# Patient Record
Sex: FEMALE | Race: OTHER | NOT HISPANIC OR LATINO
[De-identification: names, ages, dates, MRNs, and addresses within clinical notes are randomized per-mention and may not be internally consistent; named-entity substitution may affect disease eponyms.]

---

## 2017-03-01 ENCOUNTER — RESULT REVIEW (OUTPATIENT)
Age: 52
End: 2017-03-01

## 2017-11-20 ENCOUNTER — RX RENEWAL (OUTPATIENT)
Age: 52
End: 2017-11-20

## 2018-02-09 ENCOUNTER — RX RENEWAL (OUTPATIENT)
Age: 53
End: 2018-02-09

## 2018-03-15 ENCOUNTER — APPOINTMENT (OUTPATIENT)
Dept: INTERNAL MEDICINE | Facility: CLINIC | Age: 53
End: 2018-03-15
Payer: COMMERCIAL

## 2018-03-15 VITALS
OXYGEN SATURATION: 98 % | DIASTOLIC BLOOD PRESSURE: 88 MMHG | HEART RATE: 73 BPM | WEIGHT: 186 LBS | SYSTOLIC BLOOD PRESSURE: 140 MMHG | HEIGHT: 62.5 IN | BODY MASS INDEX: 33.37 KG/M2

## 2018-03-15 VITALS — SYSTOLIC BLOOD PRESSURE: 120 MMHG | DIASTOLIC BLOOD PRESSURE: 82 MMHG

## 2018-03-15 DIAGNOSIS — R03.0 ELEVATED BLOOD-PRESSURE READING, W/OUT DIAGNOSIS OF HYPERTENSION: ICD-10-CM

## 2018-03-15 PROCEDURE — 99396 PREV VISIT EST AGE 40-64: CPT

## 2018-03-16 LAB
25(OH)D3 SERPL-MCNC: 26.9 NG/ML
ALBUMIN SERPL ELPH-MCNC: 4.6 G/DL
ALP BLD-CCNC: 75 U/L
ALT SERPL-CCNC: 18 U/L
ANION GAP SERPL CALC-SCNC: 13 MMOL/L
AST SERPL-CCNC: 21 U/L
BASOPHILS # BLD AUTO: 0.03 K/UL
BASOPHILS NFR BLD AUTO: 0.6 %
BILIRUB SERPL-MCNC: 0.5 MG/DL
BUN SERPL-MCNC: 9 MG/DL
CALCIUM SERPL-MCNC: 10 MG/DL
CHLORIDE SERPL-SCNC: 95 MMOL/L
CHOLEST SERPL-MCNC: 191 MG/DL
CHOLEST/HDLC SERPL: 5.3 RATIO
CO2 SERPL-SCNC: 27 MMOL/L
CREAT SERPL-MCNC: 0.6 MG/DL
EOSINOPHIL # BLD AUTO: 0.13 K/UL
EOSINOPHIL NFR BLD AUTO: 2.5 %
GLUCOSE SERPL-MCNC: 90 MG/DL
HBA1C MFR BLD HPLC: 6.4 %
HCT VFR BLD CALC: 40.1 %
HDLC SERPL-MCNC: 36 MG/DL
HGB BLD-MCNC: 12.9 G/DL
IMM GRANULOCYTES NFR BLD AUTO: 0.4 %
LDLC SERPL CALC-MCNC: 106 MG/DL
LYMPHOCYTES # BLD AUTO: 3.19 K/UL
LYMPHOCYTES NFR BLD AUTO: 61.5 %
MAN DIFF?: NORMAL
MCHC RBC-ENTMCNC: 24.2 PG
MCHC RBC-ENTMCNC: 32.2 GM/DL
MCV RBC AUTO: 75.4 FL
MONOCYTES # BLD AUTO: 0.28 K/UL
MONOCYTES NFR BLD AUTO: 5.4 %
NEUTROPHILS # BLD AUTO: 1.54 K/UL
NEUTROPHILS NFR BLD AUTO: 29.6 %
PLATELET # BLD AUTO: 292 K/UL
POTASSIUM SERPL-SCNC: 3.9 MMOL/L
PROT SERPL-MCNC: 8.1 G/DL
RBC # BLD: 5.32 M/UL
RBC # FLD: 14.4 %
SODIUM SERPL-SCNC: 135 MMOL/L
TRIGL SERPL-MCNC: 247 MG/DL
TSH SERPL-ACNC: 1.18 UIU/ML
WBC # FLD AUTO: 5.19 K/UL

## 2018-03-22 LAB
ADJUSTED MITOGEN: >10 IU/ML
ADJUSTED TB AG: 0.11 IU/ML
M TB IFN-G BLD-IMP: NEGATIVE
QUANTIFERON GOLD NIL: 0.04 IU/ML

## 2018-07-25 ENCOUNTER — RX RENEWAL (OUTPATIENT)
Age: 53
End: 2018-07-25

## 2018-11-14 ENCOUNTER — APPOINTMENT (OUTPATIENT)
Dept: CARDIOLOGY | Facility: CLINIC | Age: 53
End: 2018-11-14
Payer: COMMERCIAL

## 2018-11-14 ENCOUNTER — NON-APPOINTMENT (OUTPATIENT)
Age: 53
End: 2018-11-14

## 2018-11-14 VITALS — DIASTOLIC BLOOD PRESSURE: 74 MMHG | OXYGEN SATURATION: 97 % | SYSTOLIC BLOOD PRESSURE: 123 MMHG | HEART RATE: 76 BPM

## 2018-11-14 VITALS — HEIGHT: 62.5 IN | WEIGHT: 185 LBS | BODY MASS INDEX: 33.19 KG/M2

## 2018-11-14 PROCEDURE — 93000 ELECTROCARDIOGRAM COMPLETE: CPT

## 2018-11-14 PROCEDURE — 99244 OFF/OP CNSLTJ NEW/EST MOD 40: CPT | Mod: 25

## 2018-11-14 NOTE — PHYSICAL EXAM
[General Appearance - Well Developed] : well developed [Normal Conjunctiva] : the conjunctiva exhibited no abnormalities [Normal Oral Mucosa] : normal oral mucosa [Normal Jugular Venous A Waves Present] : normal jugular venous A waves present [Heart Rate And Rhythm] : heart rate and rhythm were normal [Heart Sounds] : normal S1 and S2 [Murmurs] : no murmurs present [Arterial Pulses Normal] : the arterial pulses were normal [Edema] : no peripheral edema present [Veins - Varicosity Changes] : no varicosital changes were noted in the lower extremities [Respiration, Rhythm And Depth] : normal respiratory rhythm and effort [Exaggerated Use Of Accessory Muscles For Inspiration] : no accessory muscle use [Auscultation Breath Sounds / Voice Sounds] : lungs were clear to auscultation bilaterally [Chest Palpation] : palpation of the chest revealed no abnormalities [Lungs Percussion] : the lungs were normal to percussion [Abdomen Soft] : soft [Abdomen Tenderness] : non-tender [] : no hepato-splenomegaly [Abdomen Mass (___ Cm)] : no abdominal mass palpated [Nail Clubbing] : no clubbing of the fingernails [Cyanosis, Localized] : no localized cyanosis [Skin Color & Pigmentation] : normal skin color and pigmentation [Oriented To Time, Place, And Person] : oriented to person, place, and time

## 2018-11-14 NOTE — ASSESSMENT
[FreeTextEntry1] : Patient  with above hx \par \par Palpitations  ? possible symptomatic PACS or PVC ,advised the patient to decrease the coffee intake  will obtain holter monitor ,   will obtain electrolytes , lipid profile , TSH level , echocardiogram .\par \par HTN  elevated diastolic BP , would  recommend low salt diet ,   micardis/hctz 20/12.5 mg

## 2018-11-14 NOTE — REASON FOR VISIT
[Consultation] : a consultation regarding [Hyperlipidemia] : hyperlipidemia [Hypertension] : hypertension [Medication Management] : Medication management [Palpitations] : palpitations

## 2018-11-14 NOTE — HISTORY OF PRESENT ILLNESS
[FreeTextEntry1] : 53 year old female with  hx of hypertension , hyperlipidemia ,  obesity , occasional GERD who came with complain of having  intermittent skipped heart beat for last 4 days ,  skipped beats followed by sinking feeling ,  not associated with  shortness of breath or dizziness or chest pain ,   \par \par Patient does drink 2 cups of coffee , 1 cup of tea a day

## 2018-11-21 ENCOUNTER — APPOINTMENT (OUTPATIENT)
Dept: CARDIOLOGY | Facility: CLINIC | Age: 53
End: 2018-11-21
Payer: COMMERCIAL

## 2018-11-21 PROCEDURE — 93306 TTE W/DOPPLER COMPLETE: CPT

## 2018-11-28 ENCOUNTER — APPOINTMENT (OUTPATIENT)
Dept: CARDIOLOGY | Facility: CLINIC | Age: 53
End: 2018-11-28
Payer: COMMERCIAL

## 2018-11-28 PROCEDURE — 93224 XTRNL ECG REC UP TO 48 HRS: CPT

## 2018-11-30 ENCOUNTER — NON-APPOINTMENT (OUTPATIENT)
Age: 53
End: 2018-11-30

## 2018-12-17 ENCOUNTER — MEDICATION RENEWAL (OUTPATIENT)
Age: 53
End: 2018-12-17

## 2019-01-15 ENCOUNTER — RX RENEWAL (OUTPATIENT)
Age: 54
End: 2019-01-15

## 2019-02-14 ENCOUNTER — APPOINTMENT (OUTPATIENT)
Dept: INTERNAL MEDICINE | Facility: CLINIC | Age: 54
End: 2019-02-14
Payer: COMMERCIAL

## 2019-02-14 VITALS
SYSTOLIC BLOOD PRESSURE: 122 MMHG | OXYGEN SATURATION: 97 % | HEIGHT: 62.5 IN | WEIGHT: 183 LBS | DIASTOLIC BLOOD PRESSURE: 80 MMHG | BODY MASS INDEX: 32.83 KG/M2 | HEART RATE: 88 BPM

## 2019-02-14 DIAGNOSIS — R00.2 PALPITATIONS: ICD-10-CM

## 2019-02-14 PROCEDURE — 99396 PREV VISIT EST AGE 40-64: CPT

## 2019-02-26 ENCOUNTER — MESSAGE (OUTPATIENT)
Age: 54
End: 2019-02-26

## 2019-02-26 LAB
ALBUMIN SERPL ELPH-MCNC: 5.1 G/DL
ALP BLD-CCNC: 58 U/L
ALT SERPL-CCNC: 15 U/L
ANION GAP SERPL CALC-SCNC: 13 MMOL/L
AST SERPL-CCNC: 17 U/L
BASOPHILS # BLD AUTO: 0.05 K/UL
BASOPHILS NFR BLD AUTO: 0.9 %
BILIRUB SERPL-MCNC: 0.5 MG/DL
BUN SERPL-MCNC: 22 MG/DL
CALCIUM SERPL-MCNC: 10.5 MG/DL
CHLORIDE SERPL-SCNC: 101 MMOL/L
CHOLEST SERPL-MCNC: 193 MG/DL
CHOLEST/HDLC SERPL: 4.9 RATIO
CO2 SERPL-SCNC: 24 MMOL/L
CREAT SERPL-MCNC: 0.76 MG/DL
EOSINOPHIL # BLD AUTO: 0.15 K/UL
EOSINOPHIL NFR BLD AUTO: 2.8 %
GLUCOSE SERPL-MCNC: 88 MG/DL
HBA1C MFR BLD HPLC: 6.3 %
HCT VFR BLD CALC: 43.4 %
HDLC SERPL-MCNC: 39 MG/DL
HGB BLD-MCNC: 13.5 G/DL
IMM GRANULOCYTES NFR BLD AUTO: 0.4 %
LDLC SERPL CALC-MCNC: 122 MG/DL
LYMPHOCYTES # BLD AUTO: 3.39 K/UL
LYMPHOCYTES NFR BLD AUTO: 63.6 %
M TB IFN-G BLD-IMP: NEGATIVE
MAN DIFF?: NORMAL
MCHC RBC-ENTMCNC: 23.9 PG
MCHC RBC-ENTMCNC: 31.1 GM/DL
MCV RBC AUTO: 77 FL
MONOCYTES # BLD AUTO: 0.25 K/UL
MONOCYTES NFR BLD AUTO: 4.7 %
NEUTROPHILS # BLD AUTO: 1.47 K/UL
NEUTROPHILS NFR BLD AUTO: 27.6 %
PLATELET # BLD AUTO: 330 K/UL
POTASSIUM SERPL-SCNC: 4.1 MMOL/L
PROT SERPL-MCNC: 8.1 G/DL
QUANTIFERON TB PLUS MITOGEN MINUS NIL: 9.01 IU/ML
QUANTIFERON TB PLUS NIL: 0.04 IU/ML
QUANTIFERON TB PLUS TB1 MINUS NIL: 0.03 IU/ML
QUANTIFERON TB PLUS TB2 MINUS NIL: 0.03 IU/ML
RBC # BLD: 5.64 M/UL
RBC # FLD: 14.3 %
SODIUM SERPL-SCNC: 138 MMOL/L
TRIGL SERPL-MCNC: 160 MG/DL
TSH SERPL-ACNC: 1.28 UIU/ML
WBC # FLD AUTO: 5.33 K/UL

## 2019-02-26 NOTE — ADDENDUM
[FreeTextEntry1] : Reviewed labs, discussed with patient.\par Given multiple risk factors, start statin.\par Atorvastatin 10mg eprescribed.\par Pt to repeat lft's and flp in 3 mos.\par Form completed and to be faxed along with quantiferon gold results.\par

## 2019-02-26 NOTE — REVIEW OF SYSTEMS
[Vision Problems] : vision problems [Palpitations] : palpitations [Negative] : Heme/Lymph [de-identified] : folliculitis

## 2019-02-26 NOTE — HEALTH RISK ASSESSMENT
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

## 2019-02-26 NOTE — HISTORY OF PRESENT ILLNESS
[de-identified] : 52 y/o F here for CPE\par Last CPE 3/2018, but she recently changed insurance.\par Had palpitations last November, saw cardiology, Holter/ECHO done, unremarkable\par Has not increased exercise much. Committing to a diet is difficult.\par Has noticed that she also seems to be getting more hair follicle inflammation than usual.\par She is requesting an u/s of her abdomen to evaluate for gallbladder. She has no pain at present, but both had multiple family members who had gallstones that lodged in the common bile duct.  \par

## 2019-02-26 NOTE — PHYSICAL EXAM
[No Acute Distress] : no acute distress [Well Nourished] : well nourished [Well Developed] : well developed [Well-Appearing] : well-appearing [Normal Sclera/Conjunctiva] : normal sclera/conjunctiva [PERRL] : pupils equal round and reactive to light [EOMI] : extraocular movements intact [Normal Outer Ear/Nose] : the outer ears and nose were normal in appearance [Normal Oropharynx] : the oropharynx was normal [No JVD] : no jugular venous distention [Supple] : supple [No Lymphadenopathy] : no lymphadenopathy [Thyroid Normal, No Nodules] : the thyroid was normal and there were no nodules present [No Respiratory Distress] : no respiratory distress  [Clear to Auscultation] : lungs were clear to auscultation bilaterally [No Accessory Muscle Use] : no accessory muscle use [Normal Rate] : normal rate  [Regular Rhythm] : with a regular rhythm [Normal S1, S2] : normal S1 and S2 [No Murmur] : no murmur heard [No Carotid Bruits] : no carotid bruits [Pedal Pulses Present] : the pedal pulses are present [No Edema] : there was no peripheral edema [Soft] : abdomen soft [Non Tender] : non-tender [Non-distended] : non-distended [No Masses] : no abdominal mass palpated [No HSM] : no HSM [Normal Bowel Sounds] : normal bowel sounds [Normal Supraclavicular Nodes] : no supraclavicular lymphadenopathy [Normal Anterior Cervical Nodes] : no anterior cervical lymphadenopathy [No CVA Tenderness] : no CVA  tenderness [No Spinal Tenderness] : no spinal tenderness [No Joint Swelling] : no joint swelling [Grossly Normal Strength/Tone] : grossly normal strength/tone [Normal Gait] : normal gait [Coordination Grossly Intact] : coordination grossly intact [No Focal Deficits] : no focal deficits [Deep Tendon Reflexes (DTR)] : deep tendon reflexes were 2+ and symmetric [Normal Affect] : the affect was normal [Normal Insight/Judgement] : insight and judgment were intact [de-identified] : xanthalesmas on eyelid

## 2019-02-26 NOTE — ASSESSMENT
[FreeTextEntry1] : 54 y/o F here for CPE\par Exam remarkable for increased bmi\par HTN: Controlled, c/w regimen\par HLD: check flp\par Prediabetes: check alc\par Fam h/o gallstones: Check u/s abd. If stones are present, will refer her for elective cholecystectomy\par Ashtma: quiescient\par HCM: utd, Td today\par Check quantiferon gold for work\par rto 1 yr or prn

## 2019-02-28 ENCOUNTER — APPOINTMENT (OUTPATIENT)
Dept: CARDIOLOGY | Facility: CLINIC | Age: 54
End: 2019-02-28

## 2019-07-09 ENCOUNTER — MESSAGE (OUTPATIENT)
Age: 54
End: 2019-07-09

## 2019-07-11 ENCOUNTER — MESSAGE (OUTPATIENT)
Age: 54
End: 2019-07-11

## 2019-07-30 ENCOUNTER — MESSAGE (OUTPATIENT)
Age: 54
End: 2019-07-30

## 2019-08-08 ENCOUNTER — APPOINTMENT (OUTPATIENT)
Dept: INTERNAL MEDICINE | Facility: CLINIC | Age: 54
End: 2019-08-08

## 2019-08-23 ENCOUNTER — NON-APPOINTMENT (OUTPATIENT)
Age: 54
End: 2019-08-23

## 2019-08-23 ENCOUNTER — APPOINTMENT (OUTPATIENT)
Dept: PULMONOLOGY | Facility: CLINIC | Age: 54
End: 2019-08-23
Payer: COMMERCIAL

## 2019-08-23 VITALS
HEART RATE: 102 BPM | BODY MASS INDEX: 33.88 KG/M2 | HEIGHT: 63 IN | DIASTOLIC BLOOD PRESSURE: 80 MMHG | OXYGEN SATURATION: 98 % | WEIGHT: 191.2 LBS | SYSTOLIC BLOOD PRESSURE: 130 MMHG | RESPIRATION RATE: 17 BRPM

## 2019-08-23 PROCEDURE — 94010 BREATHING CAPACITY TEST: CPT

## 2019-08-23 PROCEDURE — 71046 X-RAY EXAM CHEST 2 VIEWS: CPT

## 2019-08-23 PROCEDURE — 99204 OFFICE O/P NEW MOD 45 MIN: CPT | Mod: 25

## 2019-08-23 NOTE — PROCEDURE
[FreeTextEntry1] : PFT - spi reveals normal flows; FEV1 2.10L is which is 82% of predicted, normal flow volume loop \par \par CXR- normal sized heat, no evidence of infiltrate or effusion

## 2019-08-23 NOTE — HISTORY OF PRESENT ILLNESS
[FreeTextEntry1] : Ms. Osullivan is a 54 year old female presenting to the office for a follow-up visit. She has a history of asthmatic bronchitis, GERD, LAKSHMI, chronic sinusitis, post nasal drip, H. pyroli, HTN, HLD, OW, SOB, and cough variant asthma. Her chief complaint is her coughing.\par - notes that she does not cough at night but after talking and drinking coffee\par - her coughing has been persistent for 6 weeks\par - she has LAKSHMI and is suppose to have a sleep study done in September\par - she denies SOB and palpitations\par - she has an allergy to nickel\par - she occasionally uses Flonase for her sinuses\par - she has a nebulizer but has not been using it \par -she denies any headaches, nausea, vomiting, fever, chills, sweats, chest pain, chest pressure, diarrhea, constipation, dysphagia, dizziness, leg swelling, leg pain, itchy eyes, itchy ears, heartburn, reflux, or sour taste in the mouth.

## 2019-08-23 NOTE — ASSESSMENT
[FreeTextEntry1] :  Aileen is a  54 year old female with a history of HLD, HTN, low vitamin D, cough variant asthma, H. pylori and ?LAKSHMI who now comes to the office for an pulmonary re-evaluation.\par \par shortness of breath is multifactorial due to:\par -poor mechanics of breathing \par -out of shape / overweight\par -pulmonary disease\par -cardiac disease \par \par problem 1: acute bronchitis \par - no need for any further antibiotics \par \par problem 2: asthmatic bronchitis\par - prednisone 20 mg for 7 days and 10 mg for 7 days \par - Xopenex .63 2-3x \par - Breo 200 mg 1x a day \par - Singular 10 mg before bed\par - olopaadine  .6 1 sniff 2x day\par \par problem 3: GERD\par - Zantac 300 mg at night \par - Things to avoid including overeating, spicy foods, tight clothing, eating within three hours of bed, this list is not all inclusive. \par -Rule of 2's: avoid eating too much, eating too late, eating too spicy, eating two hours before bed\par -For treatment of reflux, possible options discussed including diet control, H2 blockers, PPIs, as well as coating motility agents discussed as treatment options. Timing of meals and proximity of last meal to sleep were discussed. If symptoms persist, a formal gastrointestinal evaluation is needed. \par \par problem 4: LAKSHMI\par - r/o LAKSHMI\par - sleep study pending\par \par problem: Allergy\par - F/U for allergy testing-bloods\par \par problem : health maintenance \par -recommended yearly flu shot after October 15\par -recommended strep pneumonia vaccines: Prevnar-13 vaccine, followed by Pneumo vaccine 23 one year following\par -recommended early intervention for Upper Respiratory Infections (URIs)\par -recommended regular osteoporosis evaluations\par -recommended early dermatological evaluations\par -recommended after the age of 50 to the age of 70, colonoscopy every 5 years\par \par F/U in 6-8 weeks.\par He She is encouraged to call with any changes, concerns, or questions

## 2019-08-23 NOTE — PHYSICAL EXAM
[General Appearance - Well Developed] : well developed [Normal Appearance] : normal appearance [Well Groomed] : well groomed [General Appearance - Well Nourished] : well nourished [General Appearance - In No Acute Distress] : no acute distress [No Deformities] : no deformities [Normal Conjunctiva] : the conjunctiva exhibited no abnormalities [Normal Oropharynx] : normal oropharynx [Eyelids - No Xanthelasma] : the eyelids demonstrated no xanthelasmas [III] : III [Neck Appearance] : the appearance of the neck was normal [Neck Cervical Mass (___cm)] : no neck mass was observed [Jugular Venous Distention Increased] : there was no jugular-venous distention [Thyroid Nodule] : there were no palpable thyroid nodules [Thyroid Diffuse Enlargement] : the thyroid was not enlarged [Heart Rate And Rhythm] : heart rate and rhythm were normal [Murmurs] : no murmurs present [Heart Sounds] : normal S1 and S2 [FreeTextEntry1] : I:E ratio 1:3; expiatory wheezes

## 2019-08-23 NOTE — ADDENDUM
[FreeTextEntry1] : I, Imani Knox, acted solely as a scribe for Dr. Hui on this date 08/23/2019.\par \par All medical record entries made by the Scribe were at my, Dr. Hui, direction and personally dictated by me on 08/23/2019. I have reviewed the chart and agree that the record accurately reflects my personal performance of the history, physical exam, assessment and plan.  I have also personally directed, reviewed and agreed with the chart.

## 2019-09-10 ENCOUNTER — APPOINTMENT (OUTPATIENT)
Dept: SLEEP CENTER | Facility: CLINIC | Age: 54
End: 2019-09-10
Payer: COMMERCIAL

## 2019-09-10 ENCOUNTER — OUTPATIENT (OUTPATIENT)
Dept: OUTPATIENT SERVICES | Facility: HOSPITAL | Age: 54
LOS: 1 days | End: 2019-09-10
Payer: COMMERCIAL

## 2019-09-10 PROCEDURE — 95806 SLEEP STUDY UNATT&RESP EFFT: CPT | Mod: 26

## 2019-09-10 PROCEDURE — 95806 SLEEP STUDY UNATT&RESP EFFT: CPT

## 2019-09-23 ENCOUNTER — MEDICATION RENEWAL (OUTPATIENT)
Age: 54
End: 2019-09-23

## 2019-10-01 ENCOUNTER — MESSAGE (OUTPATIENT)
Age: 54
End: 2019-10-01

## 2019-10-04 DIAGNOSIS — G47.33 OBSTRUCTIVE SLEEP APNEA (ADULT) (PEDIATRIC): ICD-10-CM

## 2019-10-28 ENCOUNTER — APPOINTMENT (OUTPATIENT)
Dept: PULMONOLOGY | Facility: CLINIC | Age: 54
End: 2019-10-28
Payer: COMMERCIAL

## 2019-10-28 ENCOUNTER — NON-APPOINTMENT (OUTPATIENT)
Age: 54
End: 2019-10-28

## 2019-10-28 VITALS
BODY MASS INDEX: 35.7 KG/M2 | HEART RATE: 94 BPM | RESPIRATION RATE: 17 BRPM | HEIGHT: 62 IN | OXYGEN SATURATION: 98 % | DIASTOLIC BLOOD PRESSURE: 70 MMHG | WEIGHT: 194 LBS | SYSTOLIC BLOOD PRESSURE: 122 MMHG

## 2019-10-28 DIAGNOSIS — J45.901 ACUTE BRONCHITIS, UNSPECIFIED: ICD-10-CM

## 2019-10-28 DIAGNOSIS — Z23 ENCOUNTER FOR IMMUNIZATION: ICD-10-CM

## 2019-10-28 DIAGNOSIS — J20.9 ACUTE BRONCHITIS, UNSPECIFIED: ICD-10-CM

## 2019-10-28 PROCEDURE — G0008: CPT

## 2019-10-28 PROCEDURE — 90682 RIV4 VACC RECOMBINANT DNA IM: CPT

## 2019-10-28 PROCEDURE — 95012 NITRIC OXIDE EXP GAS DETER: CPT

## 2019-10-28 PROCEDURE — 99214 OFFICE O/P EST MOD 30 MIN: CPT | Mod: 25

## 2019-10-28 PROCEDURE — 94010 BREATHING CAPACITY TEST: CPT

## 2019-10-28 RX ORDER — PREDNISONE 10 MG/1
10 TABLET ORAL
Qty: 50 | Refills: 0 | Status: DISCONTINUED | COMMUNITY
Start: 2019-08-23 | End: 2019-10-28

## 2019-10-28 RX ORDER — RANITIDINE 300 MG/1
300 TABLET ORAL
Qty: 90 | Refills: 3 | Status: DISCONTINUED | COMMUNITY
Start: 2019-08-23 | End: 2019-10-28

## 2019-10-28 NOTE — PHYSICAL EXAM
[General Appearance - Well Developed] : well developed [Normal Appearance] : normal appearance [Well Groomed] : well groomed [General Appearance - Well Nourished] : well nourished [No Deformities] : no deformities [General Appearance - In No Acute Distress] : no acute distress [Normal Conjunctiva] : the conjunctiva exhibited no abnormalities [Eyelids - No Xanthelasma] : the eyelids demonstrated no xanthelasmas [Normal Oropharynx] : normal oropharynx [III] : III [Neck Appearance] : the appearance of the neck was normal [Neck Cervical Mass (___cm)] : no neck mass was observed [Jugular Venous Distention Increased] : there was no jugular-venous distention [Thyroid Diffuse Enlargement] : the thyroid was not enlarged [Thyroid Nodule] : there were no palpable thyroid nodules [Heart Rate And Rhythm] : heart rate and rhythm were normal [Heart Sounds] : normal S1 and S2 [Murmurs] : no murmurs present [Abdomen Soft] : soft [Abdomen Tenderness] : non-tender [Abdomen Mass (___ Cm)] : no abdominal mass palpated [Abnormal Walk] : normal gait [Gait - Sufficient For Exercise Testing] : the gait was sufficient for exercise testing [Nail Clubbing] : no clubbing of the fingernails [Cyanosis, Localized] : no localized cyanosis [Petechial Hemorrhages (___cm)] : no petechial hemorrhages [Skin Color & Pigmentation] : normal skin color and pigmentation [] : no rash [No Venous Stasis] : no venous stasis [Skin Lesions] : no skin lesions [No Skin Ulcers] : no skin ulcer [No Xanthoma] : no  xanthoma was observed [Deep Tendon Reflexes (DTR)] : deep tendon reflexes were 2+ and symmetric [Sensation] : the sensory exam was normal to light touch and pinprick [No Focal Deficits] : no focal deficits [Oriented To Time, Place, And Person] : oriented to person, place, and time [Impaired Insight] : insight and judgment were intact [Affect] : the affect was normal [FreeTextEntry1] : I:E ratio 1:3; expiatory wheezes

## 2019-10-28 NOTE — HISTORY OF PRESENT ILLNESS
[FreeTextEntry1] : Ms. Osullivan is a 54 year old female presenting to the office for a follow-up visit. She has a history of asthmatic bronchitis, GERD, LAKSHMI, chronic sinusitis, post nasal drip, H. pyroli, HTN, HLD, OW, SOB, and cough variant asthma. Her chief complaint is LAKSHMI.\par -she reports feeling well\par -she notes her cough has completely resolved about 3 days ago with use of Singulair and Breo\par -she states having had 1 bad cold with sinusitis and took Biaxin, since her last visit\par -she reports she isn't exercising currently\par -she reports having heartburn, due to stopping the Zantac due to the recall, and wishes to make another GI appointment\par -she reports she is s/p her sleep study\par -she reports she isn't waking up rested in the morning, but with no aches\par -she states her sinuses are clear with the use of her nasal spray\par -she denies taking any new medications, vitamins, or supplements, except vitamin C\par -she reports she is feeling 100% in terms of her asthma, and doesn’t want to take Breo continuously\par -she denies any SOB, wheezing, chest pain, chest pressure, diarrhea, constipation, dysphagia, dizziness, sour taste in the mouth, myalgias or arthralgias.

## 2019-10-28 NOTE — ADDENDUM
[FreeTextEntry1] : Documented by Tadeo August acting as a scribe for Dr. Jaime Vera on 10/28/2019.\par \par All medical record entries made by the Scribe were at my, Dr. Jaime Vera's, direction and personally dictated by me on 10/28/2019. I have reviewed the chart and agree that the record accurately reflects my personal performance of the history, physical exam, assessment and plan. I have also personally directed, reviewed, and agree with the discharge instructions.

## 2019-10-28 NOTE — REVIEW OF SYSTEMS
[Negative] : Sleep Disorder [Heartburn] : heartburn [Reflux] : reflux [As Noted in HPI] : as noted in HPI [Nonrestorative Sleep] : nonrestorative sleep [Hypersomnolence] : sleeping much more than usual [Nasal Congestion] : no nasal congestion [Postnasal Drip] : no postnasal drip [Sinus Problems] : no sinus problems [Dyspnea] : no dyspnea [Wheezing] : no wheezing [Chest Discomfort] : no chest discomfort [Dysphagia] : no dysphagia [Constipation] : no constipation [Diarrhea] : no diarrhea [Myalgias] : no myalgias [Arthralgias] : no arthralgias [Dizziness] : no dizziness [Difficulty Initiating Sleep] : no difficulty falling asleep [Difficulty Maintaining Sleep] : no difficulty maintaining sleep

## 2019-10-28 NOTE — PROCEDURE
[FreeTextEntry1] : PFT revealed mild restrictive and mild obstructive dysfunction, with a FEV1 of 1.68L, which is 68% of predicted, with a normal flow volume loop\par \par FENO was 12; a normal value being less than 25\par Fractional exhaled nitric oxide (FENO) is regarded as a simple, noninvasive method for assessing eosinophilic airway inflammation. Produced by a variety of cells within the lung, nitric oxide (NO) concentrations are generally low in healthy individuals. However, high concentrations of NO appear to be involved in nonspecific host defense mechanisms and chronic inflammatory diseases such as asthma. The American Thoracic Society (ATS) therefore has recommended using FENO to aid in the diagnosis and monitoring of eosinophilic airway inflammation and asthma, and for identifying steroid responsive individuals whose chronic respiratory symptoms may be caused by airway inflammation. \par \par Sleep study (9/10/19) revealed sleep apnea with an AHI/RICHIE of 66.1, snore index of 51.3% and a low oxygen saturation of 61.0%

## 2019-10-28 NOTE — ASSESSMENT
[FreeTextEntry1] :  Aileen is a  54 year old female with a history of HLD, HTN, low vitamin D, cough variant asthma, H. pylori and (+) LAKSHMI who now comes to the office for an pulmonary re-evaluation - improved but on Rx OSAS.\par \par shortness of breath is multifactorial due to:\par -poor mechanics of breathing \par -out of shape / overweight\par -pulmonary disease\par >asthma\par -cardiac disease \par \par problem 1: s/p acute bronchitis - resolved\par - no need for any further antibiotics \par \par You have a clinical scenario most c/q acute bronchitis the etiology of which is unknown but empiric antibiotics are indicated. Hydration, mucolytics including mucinex, robitussin and the like are indicated. Cough controlling agents will be needed. \par \par problem 2: asthma \par - continue Xopenex (0.63) TID by nebulizer, PRN  (gargle and rinse after use)\par - continue  Breo 200 mg 1x a day - for one month, then transition to Breo 100 1 inhalation QD\par - continue Singular 10 mg before bed\par \par -Asthma is believed to be caused by inherited (genetic) and environmental factor, but its exact cause is unknown. Asthma may be triggered by allergens, lung infections, or irritants in the air. Asthma triggers are different for each person \par -Inhaler technique reviewed as well as oral hygiene techniques reviewed with patient. Avoidance of cold air, extremes of temperature, rescue inhaler should be used before exercise. Order of medication reviewed with patient. Recommended use of a cool mist humidifier in the bedroom.\par \par problem 3: GERD\par -Add Pepcid 40 mg QHS \par - Things to avoid including overeating, spicy foods, tight clothing, eating within three hours of bed, this list is not all inclusive. \par -Rule of 2's: avoid eating too much, eating too late, eating too spicy, eating two hours before bed\par -For treatment of reflux, possible options discussed including diet control, H2 blockers, PPIs, as well as coating motility agents discussed as treatment options. Timing of meals and proximity of last meal to sleep were discussed. If symptoms persist, a formal gastrointestinal evaluation is needed. \par \par problem 4: LAKSHMI (+) severe\par - s/p sleep study. Complete an APAP study\par \par Sleep apnea is associated with adverse clinical consequences which an affect most organ systems. Cardiovascular disease risk includes arrhythmias, atrial fibrillation, hypertension, coronary artery disease, and stroke. Metabolic disorders include diabetes type 2, non-alcoholic fatty liver disease. Mood disorder especially depression; and cognitive decline especially in the elderly. Associations with chronic reflux/Davidson’s esophagus some but not all inclusive. \par -Reasons include arousal consistent with hypopnea; respiratory events most prominent in REM sleep or supine position; therefore sleep staging and body position are important for accurate diagnosis and estimation of AHI. \par \par problem 5: Allergy\par - F/U for allergy testing-bloods\par -Add Olopatadine 0.6% at 1 sniff/nostril BID \par \par Environmental measures for allergies were encouraged including mattress and pillow cover, air purifier, and environmental controls. \par \par problem 6: health maintenance \par -recommended yearly flu shot after October 15 (2019)\par -recommended strep pneumonia vaccines: Prevnar-13 vaccine, followed by Pneumo vaccine 23 one year following\par -recommended early intervention for Upper Respiratory Infections (URIs)\par -recommended regular osteoporosis evaluations\par -recommended early dermatological evaluations\par -recommended after the age of 50 to the age of 70, colonoscopy every 5 years\par \par F/U in 6-8 weeks.\par He She is encouraged to call with any changes, concerns, or questions

## 2019-11-18 ENCOUNTER — APPOINTMENT (OUTPATIENT)
Dept: PULMONOLOGY | Facility: CLINIC | Age: 54
End: 2019-11-18
Payer: COMMERCIAL

## 2019-11-18 VITALS
BODY MASS INDEX: 35.88 KG/M2 | OXYGEN SATURATION: 98 % | HEIGHT: 62 IN | WEIGHT: 195 LBS | SYSTOLIC BLOOD PRESSURE: 140 MMHG | RESPIRATION RATE: 17 BRPM | HEART RATE: 119 BPM | DIASTOLIC BLOOD PRESSURE: 80 MMHG

## 2019-11-18 PROCEDURE — 99214 OFFICE O/P EST MOD 30 MIN: CPT | Mod: 25

## 2019-11-18 PROCEDURE — 71046 X-RAY EXAM CHEST 2 VIEWS: CPT

## 2019-11-18 NOTE — PHYSICAL EXAM
[General Appearance - Well Developed] : well developed [Normal Appearance] : normal appearance [Well Groomed] : well groomed [General Appearance - Well Nourished] : well nourished [No Deformities] : no deformities [General Appearance - In No Acute Distress] : no acute distress [Normal Conjunctiva] : the conjunctiva exhibited no abnormalities [Eyelids - No Xanthelasma] : the eyelids demonstrated no xanthelasmas [Normal Oropharynx] : normal oropharynx [III] : III [Neck Cervical Mass (___cm)] : no neck mass was observed [Neck Appearance] : the appearance of the neck was normal [Jugular Venous Distention Increased] : there was no jugular-venous distention [Thyroid Diffuse Enlargement] : the thyroid was not enlarged [Thyroid Nodule] : there were no palpable thyroid nodules [Heart Rate And Rhythm] : heart rate and rhythm were normal [Murmurs] : no murmurs present [Heart Sounds] : normal S1 and S2 [Abdomen Soft] : soft [Abdomen Tenderness] : non-tender [Abdomen Mass (___ Cm)] : no abdominal mass palpated [Abnormal Walk] : normal gait [Gait - Sufficient For Exercise Testing] : the gait was sufficient for exercise testing [Nail Clubbing] : no clubbing of the fingernails [Petechial Hemorrhages (___cm)] : no petechial hemorrhages [Cyanosis, Localized] : no localized cyanosis [Skin Color & Pigmentation] : normal skin color and pigmentation [No Venous Stasis] : no venous stasis [] : no rash [Skin Lesions] : no skin lesions [No Skin Ulcers] : no skin ulcer [No Xanthoma] : no  xanthoma was observed [Deep Tendon Reflexes (DTR)] : deep tendon reflexes were 2+ and symmetric [Sensation] : the sensory exam was normal to light touch and pinprick [No Focal Deficits] : no focal deficits [Oriented To Time, Place, And Person] : oriented to person, place, and time [Impaired Insight] : insight and judgment were intact [Affect] : the affect was normal [FreeTextEntry1] : I:E ratio 1:3; mild expiatory wheezes

## 2019-11-18 NOTE — ADDENDUM
[FreeTextEntry1] : Documented by Isaac Oliveira acting as a scribe for Dr. Jaime Vera on 11/18/2019 \par \par All medical record entries made by the Scribe were at my, Dr. Jaime Vera's, direction and personally dictated by me on 11/18/2019 . I have reviewed the chart and agree that the record accurately reflects my personal performance of the history, physical exam, assessment and plan. I have also personally directed, reviewed, and agree with the discharge instructions.\par

## 2019-11-18 NOTE — HISTORY OF PRESENT ILLNESS
[FreeTextEntry1] : Ms. Osullivan is a 54 year old female presenting to the office for a follow-up visit. She has a history of asthmatic bronchitis, GERD, LAKSHMI, chronic sinusitis, post nasal drip, H. pyroli, HTN, HLD, OW, SOB, and cough variant asthma. Her chief complaint is currently not feeling well \par \par - She states that she has been sick since August and has been taking antibiotics but with no improvement\par - She states that she is taking Prednisone 20mg for 7 days and 10 mg for 7 days \par - Last Wednesday night, she started taking prednisone again \par - She c/o of some chest tightness \par - She states that she has a nebulizer at home and was not using for the whole week \par - She would on occasion use her proair inhaler \par - She states that her upper throat might be inflamed \par - She states that she has sleep apnea and not feeling well \par - She has not had the CPAP machine yet despite the order being placed in \par - She states she went to ENT and nothing suspicious was evaluated \par \par \par \par denies any  chest pain, chest pressure, diarrhea, constipation, dysphagia, dizziness, leg swelling, leg pain, itchy eyes, itchy ears, heartburn, reflux, or sour taste in the mouth.\par

## 2019-11-18 NOTE — PROCEDURE
[FreeTextEntry1] : CXR reveals a normal sized heart; no evidence of infiltrate or effusion--a normal appearing chest radiograph\par

## 2019-11-18 NOTE — REVIEW OF SYSTEMS
[Heartburn] : heartburn [Reflux] : reflux [As Noted in HPI] : as noted in HPI [Nonrestorative Sleep] : nonrestorative sleep [Hypersomnolence] : sleeping much more than usual [Negative] : Pulmonary Hypertension [Nasal Congestion] : no nasal congestion [Postnasal Drip] : no postnasal drip [Dyspnea] : no dyspnea [Sinus Problems] : no sinus problems [Wheezing] : no wheezing [Dysphagia] : no dysphagia [Chest Discomfort] : no chest discomfort [Constipation] : no constipation [Diarrhea] : no diarrhea [Myalgias] : no myalgias [Arthralgias] : no arthralgias [Difficulty Initiating Sleep] : no difficulty falling asleep [Dizziness] : no dizziness [Difficulty Maintaining Sleep] : no difficulty maintaining sleep

## 2019-11-18 NOTE — ASSESSMENT
[FreeTextEntry1] :  Aileen is a  54 year old female with a history of HLD, HTN, low vitamin D, cough variant asthma, H. pylori and (+) LAKSHMI who now comes to the office for an pulmonary re-evaluation - in the midst of asthmatic bronchitis. \par \par shortness of breath is multifactorial due to:\par -poor mechanics of breathing \par -out of shape / overweight\par -pulmonary disease\par >asthma\par -cardiac disease \par \par problem 1: + acute bronchitis - active \par - s/p Cefdinir/ completing Biaxin \par \par - no need for any further antibiotics \par \par You have a clinical scenario most c/q acute bronchitis the etiology of which is unknown but empiric antibiotics are indicated. Hydration, mucolytics including mucinex, robitussin and the like are indicated. Cough controlling agents will be needed. \par \par problem 2: asthma flair \par - continue Xopenex (0.63) TID by nebulizer, PRN  (gargle and rinse after use)/ -add Pulmicort 2 inhalations BID\par - continue  Breo 100 1 inhalation QD\par - continue Singular 10 mg before bed\par \par -Asthma is believed to be caused by inherited (genetic) and environmental factor, but its exact cause is unknown. Asthma may be triggered by allergens, lung infections, or irritants in the air. Asthma triggers are different for each person \par -Inhaler technique reviewed as well as oral hygiene techniques reviewed with patient. Avoidance of cold air, extremes of temperature, rescue inhaler should be used before exercise. Order of medication reviewed with patient. Recommended use of a cool mist humidifier in the bedroom.\par \par problem 3: GERD\par -Add Pepcid 40 mg QHS \par - Things to avoid including overeating, spicy foods, tight clothing, eating within three hours of bed, this list is not all inclusive. \par -Rule of 2's: avoid eating too much, eating too late, eating too spicy, eating two hours before bed\par -For treatment of reflux, possible options discussed including diet control, H2 blockers, PPIs, as well as coating motility agents discussed as treatment options. Timing of meals and proximity of last meal to sleep were discussed. If symptoms persist, a formal gastrointestinal evaluation is needed. \par \par problem 4: LAKSHMI (+) severe\par - s/p sleep study. S/p an APAP study. CPAP pending \par \par Sleep apnea is associated with adverse clinical consequences which an affect most organ systems. Cardiovascular disease risk includes arrhythmias, atrial fibrillation, hypertension, coronary artery disease, and stroke. Metabolic disorders include diabetes type 2, non-alcoholic fatty liver disease. Mood disorder especially depression; and cognitive decline especially in the elderly. Associations with chronic reflux/Davidson’s esophagus some but not all inclusive. \par -Reasons include arousal consistent with hypopnea; respiratory events most prominent in REM sleep or supine position; therefore sleep staging and body position are important for accurate diagnosis and estimation of AHI. \par \par problem 5: Allergy\par -get Blood work to include: asthma panel, food IgE panel, IgE level, eosinophil level, vitamin D level\par - F/U for allergy testing-bloods\par -Add Olopatadine 0.6% at 1 sniff/nostril BID \par \par problem 5A: r/o Immunodeficiency \par -get blood work to include: quantitative immunoglobulins, IgG subsets, strep pneumonia titers\par \par \par Environmental measures for allergies were encouraged including mattress and pillow cover, air purifier, and environmental controls. \par \par problem 6: health maintenance \par -recommended yearly flu shot after October 15 (2019)\par -recommended strep pneumonia vaccines: Prevnar-13 vaccine, followed by Pneumo vaccine 23 one year following\par -recommended early intervention for Upper Respiratory Infections (URIs)\par -recommended regular osteoporosis evaluations\par -recommended early dermatological evaluations\par -recommended after the age of 50 to the age of 70, colonoscopy every 5 years\par \par F/U in 6-8 weeks.\par He She is encouraged to call with any changes, concerns, or questions

## 2019-11-20 ENCOUNTER — LABORATORY RESULT (OUTPATIENT)
Age: 54
End: 2019-11-20

## 2019-11-21 LAB
24R-OH-CALCIDIOL SERPL-MCNC: 53.7 PG/ML
BASOPHILS # BLD AUTO: 0.07 K/UL
BASOPHILS NFR BLD AUTO: 0.8 %
EOSINOPHIL # BLD AUTO: 0.12 K/UL
EOSINOPHIL NFR BLD AUTO: 1.3 %
HCT VFR BLD CALC: 42.6 %
HGB BLD-MCNC: 13.4 G/DL
IMM GRANULOCYTES NFR BLD AUTO: 0.4 %
LYMPHOCYTES # BLD AUTO: 4.97 K/UL
LYMPHOCYTES NFR BLD AUTO: 55.5 %
MAN DIFF?: NORMAL
MCHC RBC-ENTMCNC: 24.5 PG
MCHC RBC-ENTMCNC: 31.5 GM/DL
MCV RBC AUTO: 77.9 FL
MONOCYTES # BLD AUTO: 0.62 K/UL
MONOCYTES NFR BLD AUTO: 6.9 %
NEUTROPHILS # BLD AUTO: 3.13 K/UL
NEUTROPHILS NFR BLD AUTO: 35.1 %
PLATELET # BLD AUTO: 365 K/UL
RBC # BLD: 5.47 M/UL
RBC # FLD: 14.7 %
WBC # FLD AUTO: 8.95 K/UL

## 2019-11-22 LAB — 25(OH)D3 SERPL-MCNC: 33 NG/ML

## 2019-11-25 LAB
A ALTERNATA IGE QN: <0.1 KUA/L
A FUMIGATUS IGE QN: <0.1 KUA/L
C ALBICANS IGE QN: <0.1 KUA/L
C HERBARUM IGE QN: <0.1 KUA/L
CAT DANDER IGE QN: <0.1 KUA/L
CLAM IGE QN: <0.1 KUA/L
CODFISH IGE QN: <0.1 KUA/L
COMMON RAGWEED IGE QN: <0.1 KUA/L
CORN IGE QN: <0.1 KUA/L
COW MILK IGE QN: <0.1 KUA/L
D FARINAE IGE QN: <0.1 KUA/L
D PTERONYSS IGE QN: <0.1 KUA/L
DEPRECATED A ALTERNATA IGE RAST QL: 0
DEPRECATED A FUMIGATUS IGE RAST QL: 0
DEPRECATED C ALBICANS IGE RAST QL: 0
DEPRECATED C HERBARUM IGE RAST QL: 0
DEPRECATED CAT DANDER IGE RAST QL: 0
DEPRECATED CLAM IGE RAST QL: 0
DEPRECATED CODFISH IGE RAST QL: 0
DEPRECATED COMMON RAGWEED IGE RAST QL: 0
DEPRECATED CORN IGE RAST QL: 0
DEPRECATED COW MILK IGE RAST QL: 0
DEPRECATED D FARINAE IGE RAST QL: 0
DEPRECATED D PTERONYSS IGE RAST QL: 0
DEPRECATED DOG DANDER IGE RAST QL: 0
DEPRECATED EGG WHITE IGE RAST QL: 0
DEPRECATED M RACEMOSUS IGE RAST QL: 0
DEPRECATED PEANUT IGE RAST QL: NORMAL
DEPRECATED ROACH IGE RAST QL: 0
DEPRECATED SCALLOP IGE RAST QL: <0.1 KUA/L
DEPRECATED SESAME SEED IGE RAST QL: 0
DEPRECATED SHRIMP IGE RAST QL: 0
DEPRECATED SOYBEAN IGE RAST QL: 0
DEPRECATED TIMOTHY IGE RAST QL: 0
DEPRECATED WALNUT IGE RAST QL: 0
DEPRECATED WHEAT IGE RAST QL: 0
DEPRECATED WHITE OAK IGE RAST QL: 2
DOG DANDER IGE QN: <0.1 KUA/L
EGG WHITE IGE QN: <0.1 KUA/L
M RACEMOSUS IGE QN: <0.1 KUA/L
PEANUT IGE QN: 0.1 KUA/L
ROACH IGE QN: <0.1 KUA/L
SCALLOP IGE QN: 0
SCALLOP IGE QN: <0.1 KUA/L
SESAME SEED IGE QN: <0.1 KUA/L
SOYBEAN IGE QN: <0.1 KUA/L
TIMOTHY IGE QN: <0.1 KUA/L
TOTAL IGE SMQN RAST: 49 KU/L
WALNUT IGE QN: <0.1 KUA/L
WHEAT IGE QN: <0.1 KUA/L
WHITE OAK IGE QN: 1.39 KUA/L

## 2019-11-28 ENCOUNTER — TRANSCRIPTION ENCOUNTER (OUTPATIENT)
Age: 54
End: 2019-11-28

## 2019-12-06 ENCOUNTER — APPOINTMENT (OUTPATIENT)
Dept: PULMONOLOGY | Facility: CLINIC | Age: 54
End: 2019-12-06
Payer: COMMERCIAL

## 2019-12-06 VITALS
RESPIRATION RATE: 17 BRPM | OXYGEN SATURATION: 97 % | HEIGHT: 62 IN | HEART RATE: 89 BPM | DIASTOLIC BLOOD PRESSURE: 70 MMHG | WEIGHT: 195 LBS | BODY MASS INDEX: 35.88 KG/M2 | SYSTOLIC BLOOD PRESSURE: 132 MMHG

## 2019-12-06 DIAGNOSIS — R06.02 SHORTNESS OF BREATH: ICD-10-CM

## 2019-12-06 PROCEDURE — 71046 X-RAY EXAM CHEST 2 VIEWS: CPT

## 2019-12-06 PROCEDURE — 99214 OFFICE O/P EST MOD 30 MIN: CPT | Mod: 25

## 2019-12-06 PROCEDURE — G0009: CPT

## 2019-12-06 PROCEDURE — 90670 PCV13 VACCINE IM: CPT

## 2019-12-06 NOTE — HISTORY OF PRESENT ILLNESS
[FreeTextEntry1] : Ms. Osullivan is a 54 year old female presenting to the office for a follow-up visit. She has a history of asthmatic bronchitis, GERD, LAKSHMI, chronic sinusitis, post nasal drip, H. pyroli, HTN, HLD, OW, SOB, and cough variant asthma. Her chief complaint is current illness\par -she reports she had been recovering from PNA at her last visit, and had been feeling better with the inhalers and nebulizers.\par -She had stopped the nebulizers 1 week after feeling better\par -she reports last week, she had been developing a URI, and required taking sinus medication (sudafed and an antihistamine).\par -she had been coughing up some sputum\par -she notes she has had 4 courses of ABx \par -she restarted Budesonide last week, and believed it helped her\par -she notes she still had her cough, but it was improved\par -she had seen Dr hugo, who saw some post nasal drip, and scheduled her for a sinus CT\par -she notes she coughs when she drinks coffee, when exposed to cold air, occasionally when putting on perfume, when talking a lot. She  notes when she is healthy, these triggers don’t cause her to cough\par -she reports having persistent sinuses issues, including\par -she notes she uses pepcid 20 mg BID\par she notes she is using the CPAP regularly, and will be seeing Amalia for a follow up in a few weeks\par -she denies any chest pain, chest pressure, diarrhea, constipation, dysphagia, dizziness, sour taste in the mouth, heartburn, reflux

## 2019-12-06 NOTE — PHYSICAL EXAM
[General Appearance - Well Developed] : well developed [Well Groomed] : well groomed [Normal Appearance] : normal appearance [General Appearance - Well Nourished] : well nourished [General Appearance - In No Acute Distress] : no acute distress [Normal Conjunctiva] : the conjunctiva exhibited no abnormalities [No Deformities] : no deformities [Normal Oropharynx] : normal oropharynx [Eyelids - No Xanthelasma] : the eyelids demonstrated no xanthelasmas [Neck Cervical Mass (___cm)] : no neck mass was observed [Neck Appearance] : the appearance of the neck was normal [III] : III [Jugular Venous Distention Increased] : there was no jugular-venous distention [Thyroid Nodule] : there were no palpable thyroid nodules [Thyroid Diffuse Enlargement] : the thyroid was not enlarged [Heart Sounds] : normal S1 and S2 [Heart Rate And Rhythm] : heart rate and rhythm were normal [Murmurs] : no murmurs present [Respiration, Rhythm And Depth] : normal respiratory rhythm and effort [Exaggerated Use Of Accessory Muscles For Inspiration] : no accessory muscle use [Auscultation Breath Sounds / Voice Sounds] : lungs were clear to auscultation bilaterally [Abdomen Soft] : soft [Abdomen Tenderness] : non-tender [Abdomen Mass (___ Cm)] : no abdominal mass palpated [Abnormal Walk] : normal gait [Gait - Sufficient For Exercise Testing] : the gait was sufficient for exercise testing [Nail Clubbing] : no clubbing of the fingernails [Petechial Hemorrhages (___cm)] : no petechial hemorrhages [Cyanosis, Localized] : no localized cyanosis [] : no rash [No Venous Stasis] : no venous stasis [Skin Color & Pigmentation] : normal skin color and pigmentation [Skin Lesions] : no skin lesions [Deep Tendon Reflexes (DTR)] : deep tendon reflexes were 2+ and symmetric [No Xanthoma] : no  xanthoma was observed [No Skin Ulcers] : no skin ulcer [No Focal Deficits] : no focal deficits [Oriented To Time, Place, And Person] : oriented to person, place, and time [Sensation] : the sensory exam was normal to light touch and pinprick [Affect] : the affect was normal [Impaired Insight] : insight and judgment were intact [FreeTextEntry1] : I:E ratio 1:3; faint expiatory wheezes

## 2019-12-06 NOTE — REVIEW OF SYSTEMS
[Nasal Congestion] : nasal congestion [Postnasal Drip] : postnasal drip [Sinus Problems] : sinus problems [Wheezing] : wheezing [Sputum] : sputum  [Cough] : cough [As Noted in HPI] : as noted in HPI [Negative] : Pulmonary Hypertension [Heartburn] : no heartburn [Chest Discomfort] : no chest discomfort [Dysphagia] : no dysphagia [Constipation] : no constipation [Reflux] : no reflux [Diarrhea] : no diarrhea

## 2019-12-06 NOTE — ASSESSMENT
[FreeTextEntry1] : Ms. Gallagher is a  54 year old female with a history of HLD, HTN, low vitamin D, cough variant asthma, H. pylori and (+) LAKSHMI who now comes to the office for an pulmonary re-evaluation - in the midst of asthmatic bronchitis / sinusitis (still active), and low strep titers.\par \par shortness of breath is multifactorial due to:\par -poor mechanics of breathing \par -out of shape / overweight\par -pulmonary disease\par >asthma\par -cardiac disease \par \par problem 1: + acute sinusitis - active \par -Add Augmentin 875 mg BID (completely)\par - no need for any further antibiotics \par \par You have a clinical scenario most c/q acute bronchitis the etiology of which is unknown but empiric antibiotics are indicated. Hydration, mucolytics including mucinex, robitussin and the like are indicated. Cough controlling agents will be needed. \par \par problem 2: asthma (active)\par - continue Xopenex (0.63) TID by nebulizer, PRN  (gargle and rinse after use)\par - continue Pulmicort 2 inhalations BID\par - continue  Breo 100 1 inhalation QD\par - continue Singular 10 mg before bed\par \par -Asthma is believed to be caused by inherited (genetic) and environmental factor, but its exact cause is unknown. Asthma may be triggered by allergens, lung infections, or irritants in the air. Asthma triggers are different for each person \par -Inhaler technique reviewed as well as oral hygiene techniques reviewed with patient. Avoidance of cold air, extremes of temperature, rescue inhaler should be used before exercise. Order of medication reviewed with patient. Recommended use of a cool mist humidifier in the bedroom.\par \par problem 3: GERD\par -continue Pepcid 20 mg BID\par - Things to avoid including overeating, spicy foods, tight clothing, eating within three hours of bed, this list is not all inclusive. \par -Rule of 2's: avoid eating too much, eating too late, eating too spicy, eating two hours before bed\par -For treatment of reflux, possible options discussed including diet control, H2 blockers, PPIs, as well as coating motility agents discussed as treatment options. Timing of meals and proximity of last meal to sleep were discussed. If symptoms persist, a formal gastrointestinal evaluation is needed. \par \par problem 4: LAKSHMI (+) severe\par - s/p sleep study. S/p an APAP study. CPAP pending \par \par Sleep apnea is associated with adverse clinical consequences which an affect most organ systems. Cardiovascular disease risk includes arrhythmias, atrial fibrillation, hypertension, coronary artery disease, and stroke. Metabolic disorders include diabetes type 2, non-alcoholic fatty liver disease. Mood disorder especially depression; and cognitive decline especially in the elderly. Associations with chronic reflux/Davidson’s esophagus some but not all inclusive. \par -Reasons include arousal consistent with hypopnea; respiratory events most prominent in REM sleep or supine position; therefore sleep staging and body position are important for accurate diagnosis and estimation of AHI. \par \par problem 5: Allergy\par -s/p Blood work to include: asthma panel (+), food IgE panel (+), IgE level (-), eosinophil level (-), vitamin D level (WNL)\par - F/U for allergy testing-bloods\par -continue Olopatadine 0.6% at 1 sniff/nostril BID \par \par problem 5A: r/o Immunodeficiency \par -s/p blood work to include: quantitative immunoglobulins (-), IgG subsets (-), strep pneumonia titers (low)\par -Prevnar 13 given in office 12/6/19\par \par -Due to the fact that this pt has had more infections than would be expected and immunological blood work is indicated this would include: IgG subclasses, quantitative immunoglobulins, Strep pneumoniae titers as well as Vitamin D levels. Based on this blood work we will be able to decide where the pt needs additional pneumococcal vaccine either Prevnar 13 or pneumovax. Immunology evaluation will also be potentially indicated.\par -Environmental measures for allergies were encouraged including mattress and pillow cover, air purifier, and environmental controls. \par \par problem 6: health maintenance \par -recommended yearly flu shot after October 15 (2019)\par -recommended strep pneumonia vaccines: Prevnar-13 vaccine, followed by Pneumo vaccine 23 one year following\par -recommended early intervention for Upper Respiratory Infections (URIs)\par -recommended regular osteoporosis evaluations\par -recommended early dermatological evaluations\par -recommended after the age of 50 to the age of 70, colonoscopy every 5 years\par \par F/U in 6-8 weeks.\par He She is encouraged to call with any changes, concerns, or questions

## 2019-12-06 NOTE — ADDENDUM
[FreeTextEntry1] : Documented by Tadeo August acting as a scribe for Dr. Jaime Vera on 12/06/2019.\par \par All medical record entries made by the Scribe were at my, Dr. Jaime Vera's, direction and personally dictated by me on 12/06/2019. I have reviewed the chart and agree that the record accurately reflects my personal performance of the history, physical exam, assessment and plan. I have also personally directed, reviewed, and agree with the discharge instructions.

## 2019-12-11 ENCOUNTER — APPOINTMENT (OUTPATIENT)
Dept: GASTROENTEROLOGY | Facility: CLINIC | Age: 54
End: 2019-12-11
Payer: COMMERCIAL

## 2019-12-11 VITALS
HEIGHT: 62 IN | HEART RATE: 110 BPM | SYSTOLIC BLOOD PRESSURE: 108 MMHG | TEMPERATURE: 99 F | DIASTOLIC BLOOD PRESSURE: 64 MMHG | OXYGEN SATURATION: 96 % | BODY MASS INDEX: 35.15 KG/M2 | WEIGHT: 191 LBS

## 2019-12-11 PROCEDURE — 99203 OFFICE O/P NEW LOW 30 MIN: CPT

## 2019-12-11 NOTE — ASSESSMENT
[FreeTextEntry1] : Impression and plan\par \par Patient presents to the office today to arrange for colonoscopy. The risks benefits alternatives and limitations of procedure were discussed. Patient will be given a prescription for H. pylori breath testing to be done at outside lab. Patient will call back for these results. Further suggestions will follow after colonoscopy is completed.

## 2019-12-11 NOTE — HISTORY OF PRESENT ILLNESS
[FreeTextEntry1] : Patient came to the office today to arrange for colonoscopy. It has been 5 years since her last examination. Patient looks and feels well but has been having some difficulty with asthma and is following with pulmonary for this reason. Patient denies current nausea vomiting fever chills rectal bleeding or melena. Patient has history of previous H. pylori infection and has received antibiotic treatment. H. pylori testing will be repeated at this time.

## 2019-12-18 ENCOUNTER — APPOINTMENT (OUTPATIENT)
Dept: GASTROENTEROLOGY | Facility: AMBULATORY MEDICAL SERVICES | Age: 54
End: 2019-12-18
Payer: COMMERCIAL

## 2019-12-18 PROCEDURE — 45380 COLONOSCOPY AND BIOPSY: CPT

## 2019-12-23 ENCOUNTER — APPOINTMENT (OUTPATIENT)
Dept: PULMONOLOGY | Facility: CLINIC | Age: 54
End: 2019-12-23
Payer: COMMERCIAL

## 2019-12-23 VITALS
HEIGHT: 61 IN | WEIGHT: 192 LBS | OXYGEN SATURATION: 97 % | DIASTOLIC BLOOD PRESSURE: 70 MMHG | SYSTOLIC BLOOD PRESSURE: 108 MMHG | RESPIRATION RATE: 17 BRPM | HEART RATE: 101 BPM | BODY MASS INDEX: 36.25 KG/M2

## 2019-12-23 DIAGNOSIS — J30.9 ALLERGIC RHINITIS, UNSPECIFIED: ICD-10-CM

## 2019-12-23 PROCEDURE — 99214 OFFICE O/P EST MOD 30 MIN: CPT

## 2019-12-23 NOTE — HISTORY OF PRESENT ILLNESS
[FreeTextEntry1] : Ms. Osullivan is a 54 year old female presenting to the office for a follow-up visit. She has a history of asthmatic bronchitis, GERD, LAKSHMI, chronic sinusitis, post nasal drip, H. pyroli, HTN, HLD, OW, SOB, and cough variant asthma. She is here for CPAP follow up and to discuss issues with mask leakage.\par \par Pt reports she has been using her CPAP machine for a little over 3 weeks now and reports + effect. \par She no longer snores, has more energy during the day, more refreshing sleep and is no longer having apneas she is waking up from.\par \par She uses nuance pro gel nasal pillows with some mask leak on and off. \par She feels there is a lot of pressure on some nights and that she is suffocating- she admits this may be relating to opening her mouth at night on some occasions. \par She purchased a full face mask as well on nights where she is dealing with sinus congestion.\par She also reports a couple times of dealing with muscle cramping in the legs recently- not occuring prior to pap use.\par \par She feels she is recovering with her asthma. \par She does have cough fits on and off dependant upon triggers.\par She has been using her xopenex more frequently. She is off of Pulmicort. \par She is using the breo 100 dose. \par She feels cold air, perfumes, coffee, chocolate and nickel containing products are all triggers among others. \par She reports she able to finally get through conversations without cough fits. \par She is using omeprazole 20 mg and uses pepcid PRN.\par \par She denies SOB, fever, chills, GERD, sinus issues, am headaches.\par She reports her insurance will be changing over in January. \par She has not completed an overnight pulse ox study.

## 2019-12-23 NOTE — ASSESSMENT
[FreeTextEntry1] : The plan for the patient is as follows:\par \par problem 1: Severe LAKSHMI on PAP therapy with mask leakage issues\par -change pressure settings to 4-16 cmh20; EPR 3\par -use chin strap\par -set mask type to x2 for better effect; if using FFM- advised her how to change mask type\par •	This may be due to the headgear being adjusted incorrectly i.e. over-tightened or too loose.\par •	Also frequent changes of position during the night may cause the mask to shift position and cause leakage.\par •	Facial contours can change when we lie down and muscles can relax once we are asleep, so it is always best to adjust the mask while you are in your sleeping position.\par \par Additionally, I have discussed the need for compliance to using the machine nightly especially for ongoing coverage by insurance companies.  Pt should use >4 hours nightly. Without adequate compliance, the DME company/insurance company may deny the patient replacement equipment and also have the right to re-possess the machine.  \par \par problem 2: asthma- improving\par - continue Xopenex (0.63) TID by nebulizer, PRN  (gargle and rinse after use)\par - change to Breo 200 1 inhalation QD rinse and gargle after use\par - add spiriva respimat 2.5 2 puffs in am \par - continue Singular 10 mg before bed\par \par problem 3: GERD-stable\par -continue omeprazole 20 mg PO 30 min before breakfast\par -continue Pepcid 20 mg PRN\par - Things to avoid including overeating, spicy foods, tight clothing, eating within three hours of bed, this list is not all inclusive. \par \par problem 4: Allergy/sinus\par -continue Olopatadine 0.6% at 1 sniff/nostril BID\par \par problem 5: Nocturnal hypoxemia\par -add overnight pulse oximeter study to re-assess 02 desaturation\par \par F/U in 6-8 weeks For pap therapy\par she was advised to let us know about insurance changes\par She is encouraged to call with any changes, concerns, or questions

## 2019-12-23 NOTE — PROCEDURE
[FreeTextEntry1] : downloaded sleep data:\par 26 days of use\par compliant\par Avg AHI was 5.6/hr\par average large leak time: 2 min 2 secs

## 2019-12-23 NOTE — PHYSICAL EXAM
[General Appearance - Well Developed] : well developed [Well Groomed] : well groomed [Normal Appearance] : normal appearance [General Appearance - Well Nourished] : well nourished [Normal Conjunctiva] : the conjunctiva exhibited no abnormalities [General Appearance - In No Acute Distress] : no acute distress [No Deformities] : no deformities [Normal Oropharynx] : normal oropharynx [Eyelids - No Xanthelasma] : the eyelids demonstrated no xanthelasmas [III] : III [Neck Cervical Mass (___cm)] : no neck mass was observed [Neck Appearance] : the appearance of the neck was normal [Jugular Venous Distention Increased] : there was no jugular-venous distention [Thyroid Nodule] : there were no palpable thyroid nodules [Thyroid Diffuse Enlargement] : the thyroid was not enlarged [Heart Rate And Rhythm] : heart rate and rhythm were normal [Murmurs] : no murmurs present [Heart Sounds] : normal S1 and S2 [Respiration, Rhythm And Depth] : normal respiratory rhythm and effort [Exaggerated Use Of Accessory Muscles For Inspiration] : no accessory muscle use [Abdomen Tenderness] : non-tender [Abdomen Soft] : soft [Abnormal Walk] : normal gait [Abdomen Mass (___ Cm)] : no abdominal mass palpated [Gait - Sufficient For Exercise Testing] : the gait was sufficient for exercise testing [Nail Clubbing] : no clubbing of the fingernails [Cyanosis, Localized] : no localized cyanosis [Petechial Hemorrhages (___cm)] : no petechial hemorrhages [] : no rash [Skin Color & Pigmentation] : normal skin color and pigmentation [No Focal Deficits] : no focal deficits [Impaired Insight] : insight and judgment were intact [Affect] : the affect was normal [Oriented To Time, Place, And Person] : oriented to person, place, and time [FreeTextEntry1] : I:E ratio 1:3; faint expiatory wheeze KENY otherwise CTA

## 2019-12-23 NOTE — REVIEW OF SYSTEMS
[Cough] : cough [Negative] : Sleep Disorder [As Noted in HPI] : as noted in HPI [Nasal Congestion] : nasal congestion [Chest Discomfort] : no chest discomfort [Chest Tightness] : chest tightness [Wheezing] : no wheezing [Heartburn] : no heartburn [Reflux] : no reflux [Dysphagia] : no dysphagia [Constipation] : no constipation [Diarrhea] : no diarrhea

## 2020-01-21 ENCOUNTER — RX RENEWAL (OUTPATIENT)
Age: 55
End: 2020-01-21

## 2020-01-23 ENCOUNTER — APPOINTMENT (OUTPATIENT)
Dept: GASTROENTEROLOGY | Facility: CLINIC | Age: 55
End: 2020-01-23
Payer: COMMERCIAL

## 2020-01-23 VITALS
BODY MASS INDEX: 35.12 KG/M2 | DIASTOLIC BLOOD PRESSURE: 80 MMHG | HEIGHT: 61 IN | HEART RATE: 91 BPM | WEIGHT: 186 LBS | SYSTOLIC BLOOD PRESSURE: 110 MMHG | TEMPERATURE: 98.6 F | OXYGEN SATURATION: 98 %

## 2020-01-23 PROCEDURE — 99213 OFFICE O/P EST LOW 20 MIN: CPT

## 2020-01-23 RX ORDER — TIOTROPIUM BROMIDE INHALATION SPRAY 3.12 UG/1
2.5 SPRAY, METERED RESPIRATORY (INHALATION) DAILY
Qty: 1 | Refills: 3 | Status: DISCONTINUED | COMMUNITY
Start: 2019-12-23 | End: 2020-01-23

## 2020-01-23 NOTE — ASSESSMENT
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 She self started vancomycin 250 q.i.d. and I will continue this current dosing. I suggested we go on a four-week regimen with possible tapering of a later date or if required switching over to alternate antibiotic treatment patient is in no acute distress at this time but is concerned about recurrent sinusitis and options for treating this. Patient was given my cell phone number to keep in touch [phone] as required.

## 2020-01-23 NOTE — HISTORY OF PRESENT ILLNESS
[FreeTextEntry1] : Patient came to the office today for follow up and discussion. Since her last visit the patient has been treated with vancomycin for C. difficile infection. Patient had been taking Augmentin prior to colonoscopy and during examination she had visual evidence of infection confirmed by subsequent testing. Patient did well with vancomycin and had a few weeks of normal bowel movements. Over the last few days the patient has again developed symptomatology prompting stool testing. This test was positive again. Patient self started vancomycin 250 q.i.d. She is feeling better with a rapid improvement in symptoms but is naturally concerned about recurrent infection.

## 2020-01-27 LAB
DEPRECATED KAPPA LC FREE/LAMBDA SER: 1.26 RATIO
DEPRECATED S PNEUM 1 IGG SER-MCNC: 12 MCG/ML
DEPRECATED S PNEUM12 AB SER-ACNC: 0.9 MCG/ML
DEPRECATED S PNEUM14 AB SER-ACNC: 2.8 MCG/ML
DEPRECATED S PNEUM17 IGG SER IA-MCNC: 3.5 MCG/ML
DEPRECATED S PNEUM18 IGG SER IA-MCNC: 1.4 MCG/ML
DEPRECATED S PNEUM19 IGG SER-MCNC: 1.4 MCG/ML
DEPRECATED S PNEUM19 IGG SER-MCNC: 6.9 MCG/ML
DEPRECATED S PNEUM2 IGG SER-MCNC: 3.1 MCG/ML
DEPRECATED S PNEUM20 IGG SER-MCNC: 1.7 MCG/ML
DEPRECATED S PNEUM22 IGG SER-MCNC: 47.9 MCG/ML
DEPRECATED S PNEUM23 AB SER-ACNC: 33 MCG/ML
DEPRECATED S PNEUM3 AB SER-ACNC: 0.7 MCG/ML
DEPRECATED S PNEUM34 IGG SER-MCNC: 0.9 MCG/ML
DEPRECATED S PNEUM4 AB SER-ACNC: <0.4 MCG/ML
DEPRECATED S PNEUM5 IGG SER-MCNC: 0.5 MCG/ML
DEPRECATED S PNEUM6 IGG SER-MCNC: 12.1 MCG/ML
DEPRECATED S PNEUM7 IGG SER-ACNC: 9.8 MCG/ML
DEPRECATED S PNEUM8 AB SER-ACNC: 2 MCG/ML
DEPRECATED S PNEUM9 AB SER-ACNC: 1.8 MCG/ML
DEPRECATED S PNEUM9 IGG SER-MCNC: 1.2 MCG/ML
IGA SER QL IEP: 157 MG/DL
IGG SER QL IEP: 927 MG/DL
IGG SUBSET TOTAL IGG: 949 MG/DL
IGG1 SER-MCNC: 505 MG/DL
IGG2 SER-MCNC: 320 MG/DL
IGG3 SER-MCNC: 75 MG/DL
IGG4 SER-MCNC: 35 MG/DL
IGM SER QL IEP: 41 MG/DL
KAPPA LC CSF-MCNC: 0.96 MG/DL
KAPPA LC SERPL-MCNC: 1.21 MG/DL
STREPTOCOCCUS PNEUMONIAE SEROTYPE 11A: 0.4 MCG/ML
STREPTOCOCCUS PNEUMONIAE SEROTYPE 15B: 3.4 MCG/ML
STREPTOCOCCUS PNEUMONIAE SEROTYPE 33F: 1.6 MCG/ML

## 2020-02-13 ENCOUNTER — APPOINTMENT (OUTPATIENT)
Dept: INTERNAL MEDICINE | Facility: CLINIC | Age: 55
End: 2020-02-13
Payer: COMMERCIAL

## 2020-02-13 ENCOUNTER — NON-APPOINTMENT (OUTPATIENT)
Age: 55
End: 2020-02-13

## 2020-02-13 VITALS
BODY MASS INDEX: 32.07 KG/M2 | TEMPERATURE: 98.4 F | SYSTOLIC BLOOD PRESSURE: 122 MMHG | WEIGHT: 181 LBS | DIASTOLIC BLOOD PRESSURE: 76 MMHG | OXYGEN SATURATION: 97 % | HEART RATE: 95 BPM | HEIGHT: 63 IN

## 2020-02-13 PROCEDURE — 36415 COLL VENOUS BLD VENIPUNCTURE: CPT

## 2020-02-13 PROCEDURE — 99386 PREV VISIT NEW AGE 40-64: CPT | Mod: 25

## 2020-02-13 PROCEDURE — 93000 ELECTROCARDIOGRAM COMPLETE: CPT

## 2020-02-13 PROCEDURE — 99215 OFFICE O/P EST HI 40 MIN: CPT | Mod: 25

## 2020-02-13 RX ORDER — ASPIRIN 81 MG
81 TABLET, DELAYED RELEASE (ENTERIC COATED) ORAL DAILY
Refills: 0 | Status: DISCONTINUED | COMMUNITY
End: 2020-02-13

## 2020-02-13 RX ORDER — FAMOTIDINE 40 MG/1
40 TABLET, FILM COATED ORAL
Qty: 90 | Refills: 1 | Status: DISCONTINUED | COMMUNITY
Start: 2019-10-28 | End: 2020-02-13

## 2020-02-13 NOTE — PAST MEDICAL HISTORY
[Approximately ___] : the LMP was approximately [unfilled] [Postmenopausal] : postmenopausal [Live Births ___] : P[unfilled]  [Total Preg ___] : G[unfilled]

## 2020-02-14 RX ORDER — SODIUM SULFATE, POTASSIUM SULFATE, MAGNESIUM SULFATE 17.5; 3.13; 1.6 G/ML; G/ML; G/ML
17.5-3.13-1.6 SOLUTION, CONCENTRATE ORAL
Qty: 1 | Refills: 0 | Status: COMPLETED | COMMUNITY
Start: 2019-12-11 | End: 2020-02-14

## 2020-02-14 RX ORDER — VALSARTAN AND HYDROCHLOROTHIAZIDE 80; 12.5 MG/1; MG/1
80-12.5 TABLET, FILM COATED ORAL DAILY
Qty: 90 | Refills: 3 | Status: COMPLETED | COMMUNITY
Start: 2018-11-14 | End: 2020-02-14

## 2020-02-14 NOTE — HISTORY OF PRESENT ILLNESS
[FreeTextEntry1] : The patient is a 54 year  old female who presents for annual physical examination and to establish care.\par \par \par l\par  [de-identified] : The patient is a 54-year-old female with past medical history significant for hypertension, sleep apnea, hyperlipidemia, prediabetes, chronic cough for 6 months related to cough variant asthma, C. difficile colitis, presents for routine examination.  Patient reports that she is currently under the care of a pulmonologist for symptom of cough.  She has undergone an extensive work-up by pulmonologist and otolaryngologist and was ultimately diagnosed with cough variant asthma.  Chest x-ray was reportedly negative.  Recent chest CT revealed a 3 mm nodule in the right upper lobe, ascending aorta measuring 4 cm, and 1.7 x 1.5 cm right adrenal nodule, subcentimeter hyperdense right thyroid nodule.  She has been given a prescription for MRI of the abdomen to follow-up adrenal nodule as well as thyroid ultrasound prescription to follow-up thyroid nodule.\par \par Also reports that she discontinued atorvastatin approximately 6 months ago secondary to muscle cramps.\par Patient was diagnosed with C. difficile colitis after being on Augmentin for upper respiratory infection.  She is currently on her second course of vancomycin due to recurrence which occurred January 21, 2020.  At present her bowel movements are formed and she denies any symptoms of abdominal pain, bright red blood per rectum, melena, or unexplained weight loss.

## 2020-02-14 NOTE — PHYSICAL EXAM
[No Acute Distress] : no acute distress [Well Developed] : well developed [Well Nourished] : well nourished [PERRL] : pupils equal round and reactive to light [Normal Sclera/Conjunctiva] : normal sclera/conjunctiva [Well-Appearing] : well-appearing [Normal Outer Ear/Nose] : the outer ears and nose were normal in appearance [EOMI] : extraocular movements intact [Normal Oropharynx] : the oropharynx was normal [No JVD] : no jugular venous distention [No Lymphadenopathy] : no lymphadenopathy [Thyroid Normal, No Nodules] : the thyroid was normal and there were no nodules present [Supple] : supple [No Accessory Muscle Use] : no accessory muscle use [No Respiratory Distress] : no respiratory distress  [Normal Rate] : normal rate  [Clear to Auscultation] : lungs were clear to auscultation bilaterally [Regular Rhythm] : with a regular rhythm [No Murmur] : no murmur heard [Normal S1, S2] : normal S1 and S2 [No Carotid Bruits] : no carotid bruits [No Edema] : there was no peripheral edema [Normal Appearance] : normal in appearance [No Nipple Discharge] : no nipple discharge [Soft] : abdomen soft [Non Tender] : non-tender [No Axillary Lymphadenopathy] : no axillary lymphadenopathy [Non-distended] : non-distended [No Masses] : no abdominal mass palpated [Normal Bowel Sounds] : normal bowel sounds [No HSM] : no HSM [Normal Posterior Cervical Nodes] : no posterior cervical lymphadenopathy [Normal Anterior Cervical Nodes] : no anterior cervical lymphadenopathy [Grossly Normal Strength/Tone] : grossly normal strength/tone [No Joint Swelling] : no joint swelling [No Rash] : no rash [Coordination Grossly Intact] : coordination grossly intact [Normal Gait] : normal gait [No Focal Deficits] : no focal deficits [Normal Affect] : the affect was normal [Alert and Oriented x3] : oriented to person, place, and time [Normal Insight/Judgement] : insight and judgment were intact

## 2020-02-14 NOTE — HEALTH RISK ASSESSMENT
[Good] : ~his/her~  mood as  good [Yes] : Yes [No falls in past year] : Patient reported no falls in the past year [0] : 2) Feeling down, depressed, or hopeless: Not at all (0) [With Significant Other] : lives with significant other [# of Members in Household ___] :  household currently consist of [unfilled] member(s) [Graduate School] : graduate school [Employed] : employed [] :  [# Of Children ___] : has [unfilled] children [Fully functional (using the telephone, shopping, preparing meals, housekeeping, doing laundry, using] : Fully functional and needs no help or supervision to perform IADLs (using the telephone, shopping, preparing meals, housekeeping, doing laundry, using transportation, managing medications and managing finances) [Feels Safe at Home] : Feels safe at home [Fully functional (bathing, dressing, toileting, transferring, walking, feeding)] : Fully functional (bathing, dressing, toileting, transferring, walking, feeding) [] : No [de-identified] : rarely [de-identified] : does not exercise regularly [Change in mental status noted] : No change in mental status noted [Language] : denies difficulty with language [Behavior] : denies difficulty with behavior [Handling Complex Tasks] : denies difficulty handling complex tasks [Reasoning] : denies difficulty with reasoning [Reports changes in hearing] : Reports no changes in hearing [Reports changes in vision] : Reports no changes in vision [Carbon Monoxide Detector] : no carbon monoxide detector [Smoke Detector] : no smoke detector [Sunscreen] : does not use sunscreen [Seat Belt] : does not use seat belt [BoneDensityDate] : 11/18 [PapSmearDate] : 08/19 [MammogramDate] : 11/19 [ColonoscopyDate] : 12/19 [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

## 2020-02-26 ENCOUNTER — APPOINTMENT (OUTPATIENT)
Dept: INTERNAL MEDICINE | Facility: CLINIC | Age: 55
End: 2020-02-26
Payer: COMMERCIAL

## 2020-02-26 VITALS
BODY MASS INDEX: 32.07 KG/M2 | HEIGHT: 63 IN | HEART RATE: 81 BPM | OXYGEN SATURATION: 97 % | SYSTOLIC BLOOD PRESSURE: 100 MMHG | TEMPERATURE: 98.5 F | DIASTOLIC BLOOD PRESSURE: 84 MMHG | WEIGHT: 181 LBS

## 2020-02-26 VITALS — DIASTOLIC BLOOD PRESSURE: 64 MMHG | SYSTOLIC BLOOD PRESSURE: 120 MMHG

## 2020-02-26 PROCEDURE — 99213 OFFICE O/P EST LOW 20 MIN: CPT

## 2020-02-26 NOTE — HISTORY OF PRESENT ILLNESS
[FreeTextEntry1] : The patient is a 55-year-old female who presents for follow-up and review of recent blood work. [de-identified] : The patient is a 55-year-old female with past medical history significant for elevated hemoglobin A1c, hyperlipidemia, hypertension, obstructive sleep apnea, obesity, thyroid nodule, and adrenal nodule presents for follow-up and review of recent blood work.  Patient reports that she was recently seen by her endocrinologist who performed a biopsy on her thyroid nodule.  Pathology was noted to be negative.  She is also currently undergoing work-up for adrenal nodule to ensure that it is not secreting excess hormone.  Blood work was done over the weekend and results are still pending.  Patient admits that she had been taking lipid-lowering medication last year however has stopped the medication due to possible cramps of her lower extremities.  She admits that she is not completely clear that they were related to the medication,  however since she had not repeated blood work since starting the statin she decided to stop it altogether and has been off it for many months.  Patient admits that she is not always compliant with low carbohydrate diet due to her busy schedule.  She also does not exercise on a regular basis.  She reviewed recent blood work with her endocrinologist and discussed elevated hemoglobin A1c of 6.7.  Together they decided that she will try to adhere to lifestyle modification changes and repeat blood work again in 3 months.

## 2020-02-26 NOTE — PHYSICAL EXAM
[EOMI] : extraocular movements intact [No Lymphadenopathy] : no lymphadenopathy [Normal Sclera/Conjunctiva] : normal sclera/conjunctiva [Supple] : supple [Normal] : soft, non-tender, non-distended, no masses palpated, no HSM and normal bowel sounds [No Edema] : there was no peripheral edema [Alert and Oriented x3] : oriented to person, place, and time [Normal Affect] : the affect was normal [No Focal Deficits] : no focal deficits [Normal Insight/Judgement] : insight and judgment were intact [de-identified] : Thyroid nodule

## 2020-02-26 NOTE — DATA REVIEWED
[FreeTextEntry1] : Recent blood work was reviewed with the patient.  Abnormal results were as follows:\par \par HDL = 33 low\par Triglycerides = 153 elevated\par LDL cholesterol = 111 elevated\par Glucose = 112 elevated\par ALT = 32 elevated\par Hemoglobin A1c = 6.7 elevated\par

## 2020-03-04 ENCOUNTER — APPOINTMENT (OUTPATIENT)
Dept: PULMONOLOGY | Facility: CLINIC | Age: 55
End: 2020-03-04

## 2020-04-22 LAB — SARS-COV RNA ISLT QL NAA+PROBE: NOT DETECTED

## 2020-05-25 ENCOUNTER — TRANSCRIPTION ENCOUNTER (OUTPATIENT)
Age: 55
End: 2020-05-25

## 2020-06-16 ENCOUNTER — NON-APPOINTMENT (OUTPATIENT)
Age: 55
End: 2020-06-16

## 2020-09-09 ENCOUNTER — APPOINTMENT (OUTPATIENT)
Dept: PULMONOLOGY | Facility: CLINIC | Age: 55
End: 2020-09-09

## 2020-09-22 ENCOUNTER — TRANSCRIPTION ENCOUNTER (OUTPATIENT)
Age: 55
End: 2020-09-22

## 2021-02-24 ENCOUNTER — APPOINTMENT (OUTPATIENT)
Dept: INTERNAL MEDICINE | Facility: CLINIC | Age: 56
End: 2021-02-24

## 2021-02-24 DIAGNOSIS — Z11.1 ENCOUNTER FOR SCREENING FOR RESPIRATORY TUBERCULOSIS: ICD-10-CM

## 2021-04-23 ENCOUNTER — APPOINTMENT (OUTPATIENT)
Dept: INTERNAL MEDICINE | Facility: CLINIC | Age: 56
End: 2021-04-23
Payer: COMMERCIAL

## 2021-04-23 VITALS
OXYGEN SATURATION: 99 % | WEIGHT: 179 LBS | DIASTOLIC BLOOD PRESSURE: 89 MMHG | RESPIRATION RATE: 17 BRPM | SYSTOLIC BLOOD PRESSURE: 130 MMHG | TEMPERATURE: 97.7 F | HEART RATE: 91 BPM | HEIGHT: 63 IN | BODY MASS INDEX: 31.71 KG/M2

## 2021-04-23 DIAGNOSIS — J45.901 ACUTE BRONCHITIS, UNSPECIFIED: ICD-10-CM

## 2021-04-23 DIAGNOSIS — R91.1 SOLITARY PULMONARY NODULE: ICD-10-CM

## 2021-04-23 DIAGNOSIS — G47.33 OBSTRUCTIVE SLEEP APNEA (ADULT) (PEDIATRIC): ICD-10-CM

## 2021-04-23 DIAGNOSIS — R25.2 CRAMP AND SPASM: ICD-10-CM

## 2021-04-23 DIAGNOSIS — E66.3 OVERWEIGHT: ICD-10-CM

## 2021-04-23 DIAGNOSIS — J20.9 ACUTE BRONCHITIS, UNSPECIFIED: ICD-10-CM

## 2021-04-23 PROCEDURE — 99072 ADDL SUPL MATRL&STAF TM PHE: CPT

## 2021-04-23 PROCEDURE — 99396 PREV VISIT EST AGE 40-64: CPT

## 2021-04-23 RX ORDER — FIDAXOMICIN 200 MG/1
200 TABLET, FILM COATED ORAL TWICE DAILY
Qty: 20 | Refills: 0 | Status: COMPLETED | COMMUNITY
Start: 2020-04-07 | End: 2021-04-23

## 2021-04-23 RX ORDER — VANCOMYCIN HYDROCHLORIDE 250 MG/1
250 CAPSULE ORAL 4 TIMES DAILY
Qty: 40 | Refills: 3 | Status: COMPLETED | COMMUNITY
Start: 2020-01-23 | End: 2021-04-23

## 2021-04-23 RX ORDER — LEVALBUTEROL HYDROCHLORIDE 0.63 MG/3ML
0.63 SOLUTION RESPIRATORY (INHALATION)
Qty: 120 | Refills: 3 | Status: COMPLETED | COMMUNITY
Start: 2019-08-23 | End: 2021-04-23

## 2021-04-23 RX ORDER — VALSARTAN AND HYDROCHLOROTHIAZIDE 80; 12.5 MG/1; MG/1
80-12.5 TABLET, FILM COATED ORAL
Qty: 90 | Refills: 1 | Status: COMPLETED | COMMUNITY
Start: 2020-02-13 | End: 2021-04-23

## 2021-04-23 RX ORDER — FLUTICASONE FUROATE AND VILANTEROL TRIFENATATE 200; 25 UG/1; UG/1
200-25 POWDER RESPIRATORY (INHALATION) DAILY
Qty: 3 | Refills: 1 | Status: COMPLETED | COMMUNITY
Start: 2019-09-23 | End: 2021-04-23

## 2021-04-23 RX ORDER — FLUTICASONE FUROATE AND VILANTEROL TRIFENATATE 100; 25 UG/1; UG/1
100-25 POWDER RESPIRATORY (INHALATION)
Qty: 3 | Refills: 1 | Status: COMPLETED | COMMUNITY
Start: 2019-10-28 | End: 2021-04-23

## 2021-04-23 RX ORDER — BUDESONIDE 0.5 MG/2ML
0.5 INHALANT ORAL TWICE DAILY
Qty: 60 | Refills: 3 | Status: COMPLETED | COMMUNITY
Start: 2019-11-18 | End: 2021-04-23

## 2021-04-23 RX ORDER — UMECLIDINIUM 62.5 UG/1
62.5 AEROSOL, POWDER ORAL
Qty: 3 | Refills: 1 | Status: COMPLETED | COMMUNITY
Start: 2020-01-23 | End: 2021-04-23

## 2021-04-23 NOTE — PHYSICAL EXAM
[No Acute Distress] : no acute distress [Well Nourished] : well nourished [Well Developed] : well developed [Well-Appearing] : well-appearing [Normal Sclera/Conjunctiva] : normal sclera/conjunctiva [PERRL] : pupils equal round and reactive to light [EOMI] : extraocular movements intact [Normal Outer Ear/Nose] : the outer ears and nose were normal in appearance [No JVD] : no jugular venous distention [No Lymphadenopathy] : no lymphadenopathy [Supple] : supple [Thyroid Normal, No Nodules] : the thyroid was normal and there were no nodules present [No Respiratory Distress] : no respiratory distress  [No Accessory Muscle Use] : no accessory muscle use [Clear to Auscultation] : lungs were clear to auscultation bilaterally [Normal Rate] : normal rate  [Regular Rhythm] : with a regular rhythm [Normal S1, S2] : normal S1 and S2 [No Murmur] : no murmur heard [No Carotid Bruits] : no carotid bruits [Pedal Pulses Present] : the pedal pulses are present [No Edema] : there was no peripheral edema [Soft] : abdomen soft [Non Tender] : non-tender [Non-distended] : non-distended [No HSM] : no HSM [Normal Bowel Sounds] : normal bowel sounds [Normal Posterior Cervical Nodes] : no posterior cervical lymphadenopathy [Normal Anterior Cervical Nodes] : no anterior cervical lymphadenopathy [No Joint Swelling] : no joint swelling [Grossly Normal Strength/Tone] : grossly normal strength/tone [No Rash] : no rash [Coordination Grossly Intact] : coordination grossly intact [No Focal Deficits] : no focal deficits [Normal Gait] : normal gait [Normal Affect] : the affect was normal [Normal Insight/Judgement] : insight and judgment were intact [Normal TMs] : both tympanic membranes were normal [Normal Appearance] : normal in appearance [No Masses] : no palpable masses [No Nipple Discharge] : no nipple discharge [No Axillary Lymphadenopathy] : no axillary lymphadenopathy [Alert and Oriented x3] : oriented to person, place, and time

## 2021-04-23 NOTE — PLAN
[FreeTextEntry1] : Healthcare maintenance: Patient is up-to-date with mammogram and colonoscopy..  A prescription for bone density was provided.

## 2021-04-23 NOTE — DATA REVIEWED
[FreeTextEntry1] : Blood work from patient's endocrinologist was reviewed\par Echocardiogram dated March 10, 2021 was reviewed conclusion: Moderate LVH, ejection fraction normal at 65%.  Ascending aorta is dilated diameter equals 3.8 cm.  There is no tricuspid regurgitation.  There is no pericardial effusion.\par \par \par \par

## 2021-04-23 NOTE — HEALTH RISK ASSESSMENT
[Good] : ~his/her~  mood as  good [No] : No [No falls in past year] : Patient reported no falls in the past year [0] : 2) Feeling down, depressed, or hopeless: Not at all (0) [With Family] : lives with family [Employed] : employed [Graduate School] : graduate school [] :  [Feels Safe at Home] : Feels safe at home [Fully functional (bathing, dressing, toileting, transferring, walking, feeding)] : Fully functional (bathing, dressing, toileting, transferring, walking, feeding) [Fully functional (using the telephone, shopping, preparing meals, housekeeping, doing laundry, using] : Fully functional and needs no help or supervision to perform IADLs (using the telephone, shopping, preparing meals, housekeeping, doing laundry, using transportation, managing medications and managing finances) [Smoke Detector] : smoke detector [Carbon Monoxide Detector] : carbon monoxide detector [Seat Belt] :  uses seat belt [Sunscreen] : uses sunscreen [] : No [de-identified] : does not exercising [Change in mental status noted] : No change in mental status noted [Language] : denies difficulty with language [Behavior] : denies difficulty with behavior [Handling Complex Tasks] : denies difficulty handling complex tasks [Reasoning] : denies difficulty with reasoning [Reports changes in hearing] : Reports no changes in hearing [Reports changes in vision] : Reports no changes in vision [Reports changes in dental health] : Reports no changes in dental health [MammogramDate] : 12/20 [PapSmearDate] : 11/20 [BoneDensityDate] : 2 years [ColonoscopyDate] : 12/2019

## 2021-04-23 NOTE — HISTORY OF PRESENT ILLNESS
[FreeTextEntry1] : The patient is a 56 year  old female who presents for annual physical examination.\par  [de-identified] : The patient is a 51-year-old female with past medical history significant for hypertension, hyperlipidemia, sleep apnea, thyroid nodule, adrenal nodule, prediabetes, presents for annual wellness exam.\par Patient complains of cramps in calves and thighs which are intermittent and she is concerned it may be secondary to lipid lowering medication. \par Patient was recently seen by her cardiologist and underwent echocardiogram and a CTA of the coronary arteries.  Based on these findings she was advised to take aspirin daily as opposed to every other day as she had been taking and continue statin.  She was advised to repeat echocardiogram in 6 months given findings of left ventricular hypertrophy.\par Dr. Guerrier had discontinued valsartan last year as it was thought that cough might have been secondary to calcium channel blocker valsartan.  She is currently taking amlodipine 5 mg daily and hydrochlorothiazide 12.5 mg daily.  Patient monitors blood pressure readings at home and systolic readings have been within normal limits and diastolic readings are typically in the high 80s.\par \par Patient was also recently seen by her endocrinologist, Dr. Noland.  Blood work and thyroid ultrasound were reportedly stable.\par She admits that she has not been exercising regularly and regained some of the weight she had lost last year.\par

## 2021-12-15 ENCOUNTER — TRANSCRIPTION ENCOUNTER (OUTPATIENT)
Age: 56
End: 2021-12-15

## 2022-01-10 ENCOUNTER — TRANSCRIPTION ENCOUNTER (OUTPATIENT)
Age: 57
End: 2022-01-10

## 2022-05-27 ENCOUNTER — APPOINTMENT (OUTPATIENT)
Dept: INTERNAL MEDICINE | Facility: CLINIC | Age: 57
End: 2022-05-27
Payer: COMMERCIAL

## 2022-05-27 PROCEDURE — 99212 OFFICE O/P EST SF 10 MIN: CPT | Mod: 95

## 2022-05-27 NOTE — HISTORY OF PRESENT ILLNESS
[FreeTextEntry8] : \par \par TEB_Webcam Visit  = 15 mins.\par \par COVID-19 + today.\par \par Symptoms = sinus congestion.\par \par

## 2022-05-27 NOTE — PLAN
[FreeTextEntry1] : \par \par Pt is going to: [facility] now ([facility]), due to having: COVID-19.\par \par Pt okay with the plan.\par \par All questions answered.\par \par \par \par \par

## 2022-05-27 NOTE — REVIEW OF SYSTEMS
[Negative] : Heme/Lymph [FreeTextEntry2] : Symptoms = sinus congestion. [FreeTextEntry4] : Symptoms = sinus congestion.

## 2022-07-05 DIAGNOSIS — I80.01 PHLEBITIS AND THROMBOPHLEBITIS OF SUPERFICIAL VESSELS OF RIGHT LOWER EXTREMITY: ICD-10-CM

## 2022-07-27 ENCOUNTER — APPOINTMENT (OUTPATIENT)
Dept: INTERNAL MEDICINE | Facility: CLINIC | Age: 57
End: 2022-07-27

## 2022-07-27 VITALS
TEMPERATURE: 98.3 F | SYSTOLIC BLOOD PRESSURE: 110 MMHG | HEART RATE: 97 BPM | DIASTOLIC BLOOD PRESSURE: 77 MMHG | RESPIRATION RATE: 17 BRPM | HEIGHT: 63 IN | BODY MASS INDEX: 32.43 KG/M2 | OXYGEN SATURATION: 98 % | WEIGHT: 183 LBS

## 2022-07-27 DIAGNOSIS — E27.8 OTHER SPECIFIED DISORDERS OF ADRENAL GLAND: ICD-10-CM

## 2022-07-27 PROCEDURE — 99396 PREV VISIT EST AGE 40-64: CPT

## 2022-07-27 PROCEDURE — 99072 ADDL SUPL MATRL&STAF TM PHE: CPT

## 2022-07-27 RX ORDER — ALBUTEROL SULFATE 90 UG/1
108 (90 BASE) INHALANT RESPIRATORY (INHALATION)
Qty: 7 | Refills: 0 | Status: COMPLETED | COMMUNITY
Start: 2022-05-27

## 2022-07-27 NOTE — HISTORY OF PRESENT ILLNESS
[FreeTextEntry1] : The patient is a 57 year  old female who presents for annual physical examination.\par  [de-identified] : Patient is a 57-year-old female with past medical history significant for hypertension, hyperlipidemia, prediabetes, obesity, obstructive sleep apnea, coronary atherosclerosis, presents for annual wellness exam.\par \par States that she developed 2 areas of erythema of her right lower extremity earlier this month.  Due to her history of superficial thrombophlebitis she was seen by vascular surgery and underwent venous Doppler which was negative.  She was also seen by hematologist for hypercoagulable work-up which was unrevealing.  Patient remains on aspirin 81 mg daily.\par Patient's hematologist also ordered a CT of the chest which revealed mild calcified coronary atherosclerotic disease, right axillary enlarged lymph node.  She is scheduled for ultrasound-guided biopsy next week.\par \par Blood work drawn by her cardiologist revealed LDL = 84.  She was instructed to increase atorvastatin dose from 10 mg to 20 mg as goal LDL target is less than 70.\par \par Patient reports that she continues to struggle to lose weight.  Admits that she has not been exercising regularly.  \par Has received series of 2 COVID-19 vaccinations and 1 booster.

## 2022-07-27 NOTE — PHYSICAL EXAM
[Normal TMs] : both tympanic membranes were normal [No Carotid Bruits] : no carotid bruits [No Edema] : there was no peripheral edema [Normal Appearance] : normal in appearance [No Masses] : no palpable masses [No Nipple Discharge] : no nipple discharge [No Axillary Lymphadenopathy] : no axillary lymphadenopathy [Normal] : soft, non-tender, non-distended, no masses palpated, no HSM and normal bowel sounds [Normal Posterior Cervical Nodes] : no posterior cervical lymphadenopathy [Normal Anterior Cervical Nodes] : no anterior cervical lymphadenopathy [No Joint Swelling] : no joint swelling [Grossly Normal Strength/Tone] : grossly normal strength/tone [No Rash] : no rash [No Focal Deficits] : no focal deficits [Normal Affect] : the affect was normal [Alert and Oriented x3] : oriented to person, place, and time [Normal Insight/Judgement] : insight and judgment were intact

## 2022-07-27 NOTE — ASSESSMENT
[FreeTextEntry1] : Healthcare maintenance: Patient is up-to-date with mammography, bone density, GYN exam, and colonoscopy.\par Stool fit provided for the patient.\par Up-to-date with COVID-19 vaccination.

## 2022-07-27 NOTE — HEALTH RISK ASSESSMENT
[Good] : ~his/her~  mood as  good [Never] : Never [Yes] : Yes [No falls in past year] : Patient reported no falls in the past year [0] : 2) Feeling down, depressed, or hopeless: Not at all (0) [PHQ-2 Negative - No further assessment needed] : PHQ-2 Negative - No further assessment needed [Patient reported mammogram was normal] : Patient reported mammogram was normal [Patient reported PAP Smear was normal] : Patient reported PAP Smear was normal [Patient reported bone density results were normal] : Patient reported bone density results were normal [With Family] : lives with family [# of Members in Household ___] :  household currently consist of [unfilled] member(s) [Employed] : employed [Graduate School] : graduate school [] :  [# Of Children ___] : has [unfilled] children [Feels Safe at Home] : Feels safe at home [Fully functional (bathing, dressing, toileting, transferring, walking, feeding)] : Fully functional (bathing, dressing, toileting, transferring, walking, feeding) [Fully functional (using the telephone, shopping, preparing meals, housekeeping, doing laundry, using] : Fully functional and needs no help or supervision to perform IADLs (using the telephone, shopping, preparing meals, housekeeping, doing laundry, using transportation, managing medications and managing finances) [Smoke Detector] : smoke detector [Carbon Monoxide Detector] : carbon monoxide detector [Seat Belt] :  uses seat belt [Sunscreen] : uses sunscreen [Designated Healthcare Proxy] : Designated healthcare proxy [de-identified] : rarely [de-identified] : does not exercise [[id number]] : 0 [Change in mental status noted] : No change in mental status noted [Language] : denies difficulty with language [Behavior] : denies difficulty with behavior [Handling Complex Tasks] : denies difficulty handling complex tasks [Reasoning] : denies difficulty with reasoning [Reports changes in hearing] : Reports no changes in hearing [Reports changes in vision] : Reports no changes in vision [Reports changes in dental health] : Reports no changes in dental health [MammogramDate] : 12/23/2021 [PapSmearDate] : 11/2021 [BoneDensityDate] : 2020 [ColonoscopyDate] : 12/2019 [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

## 2022-08-04 ENCOUNTER — RESULT REVIEW (OUTPATIENT)
Age: 57
End: 2022-08-04

## 2022-08-16 ENCOUNTER — APPOINTMENT (OUTPATIENT)
Dept: BREAST CENTER | Facility: CLINIC | Age: 57
End: 2022-08-16

## 2022-08-16 ENCOUNTER — NON-APPOINTMENT (OUTPATIENT)
Age: 57
End: 2022-08-16

## 2022-08-16 VITALS
BODY MASS INDEX: 33.13 KG/M2 | HEART RATE: 76 BPM | HEIGHT: 62 IN | SYSTOLIC BLOOD PRESSURE: 119 MMHG | WEIGHT: 180 LBS | DIASTOLIC BLOOD PRESSURE: 87 MMHG

## 2022-08-16 DIAGNOSIS — Z80.0 FAMILY HISTORY OF MALIGNANT NEOPLASM OF DIGESTIVE ORGANS: ICD-10-CM

## 2022-08-16 DIAGNOSIS — Z78.9 OTHER SPECIFIED HEALTH STATUS: ICD-10-CM

## 2022-08-16 DIAGNOSIS — Z80.3 FAMILY HISTORY OF MALIGNANT NEOPLASM OF BREAST: ICD-10-CM

## 2022-08-16 PROCEDURE — 99072 ADDL SUPL MATRL&STAF TM PHE: CPT

## 2022-08-16 PROCEDURE — 99245 OFF/OP CONSLTJ NEW/EST HI 55: CPT

## 2022-08-16 RX ORDER — MONTELUKAST 10 MG/1
10 TABLET, FILM COATED ORAL
Qty: 1 | Refills: 1 | Status: DISCONTINUED | COMMUNITY
Start: 2019-08-23 | End: 2022-08-16

## 2022-08-16 RX ORDER — OLOPATADINE HYDROCHLORIDE 665 UG/1
0.6 SPRAY, METERED NASAL
Qty: 3 | Refills: 1 | Status: DISCONTINUED | COMMUNITY
Start: 2019-08-23 | End: 2022-08-16

## 2022-08-16 NOTE — PHYSICAL EXAM
[EOMI] : extra ocular movement intact [Sclera nonicteric] : sclera nonicteric [Supple] : supple [No Supraclavicular Adenopathy] : no supraclavicular adenopathy [No Cervical Adenopathy] : no cervical adenopathy [No Thyromegaly] : no thyromegaly [Clear to Auscultation Bilat] : clear to auscultation bilaterally [Normal Sinus Rhythm] : normal sinus rhythm [Normal S1, S2] : normal S1 and S2 [Examined in the supine and seated position] : examined in the supine and seated position [Bra Size: ___] : Bra Size: [unfilled] [No Axillary Lymphadenopathy] : no left axillary lymphadenopathy [Soft] : abdomen soft [Not Tender] : non-tender [No Palpable Masses] : no abdominal mass palpated [de-identified] : 3 cm mobile node high axillas. [de-identified] : Low transverse scar.

## 2022-08-16 NOTE — DATA REVIEWED
[FreeTextEntry1] : Bilateral mammogram 12/23/2021:  No evidence of malignancy.\par \par Bilateral ultrasound ([facility]) 6/22/2022:  No evidence of malignancy.\par \par CT abd/pelvis 7/20/2022:  Right adrenal nodule 18x16 mm (similar to 2/2020).  Several mesenteric lymph nodes borderline size.\par \par CT chest:  7/20/2022:  Right axillary node 36x14 mm. Right adrenal nodule.\par \par Pathology 8/4/2022:  Right axillary node= metastatic adenocarcinoma, favor breast,\par \par [facility]  Small area of architectural distortion middle depth 12:00 on sixto. Small grouping of coarse heterogeneous microcalcifications 12:00 anterior to middle depth associated with distortion. \par \par \par Bilateral breast ultrasound 8/11/2022:  Right 12 N6 irregular isoechoic heterogeneous mass with angular margins and associated grouped calcifications correlates with mammogram [id number] mm.  Asymmetrical cortical thickening 9mm, of 12n42m11 mm axillary node.\par \par PET/CT 8/11/2022:  FDG avid upper right breast nodule suspicious for malignancy. 2 prominent right axillary nodes with asymmetrical cortical thickening or mild FDG uptake, suspicious for metastasis.  Right adrenal nodule 1.8 cm.  9 mm left upper pole thyroid nodule.\par \par Thyroid ultrasound 8/10/2022:  Right lower pole stable nodule 9x5x8 mm, left mid upper pole stable 10x6x9 mm nodule.  New lower pole 11x6x9 mm nodule- and 6x4x8 mm nodule follow-up in 6 months.\par \par \par (Images reviewed.)
11-Oct-2018 19:08

## 2022-08-16 NOTE — HISTORY OF PRESENT ILLNESS
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  She then had some erythema of her right lower extremity and although it didn't seem like another episode of phlebitis, she was advised to see an Oncologist to evaluate for an underlying malignancy.  She saw Dr. Bernardino Ortiz and had a CT of her chest , abdomen and pelvis.  She was found to have a right axillary node.  A biopsy of the node showed metastatic adenocarcinoma, possible breast primary.  She had a mammogram and breast ultrasound and a suspicious mass was found in the right breast.  A core biopsy is scheduled in 2 days. PET/CT does not show distant metastatic disease.\par \par She has a core biopsy scheduled of her right breast in 2 days.\par \par She is here with her  who is an Anesthesiologist at [facility].

## 2022-08-16 NOTE — PAST MEDICAL HISTORY
[Postmenopausal] : The patient is postmenopausal [Menarche Age ____] : age at menarche was [unfilled] [Total Preg ___] : G[unfilled] [Live Births ___] : P[unfilled]  [Age At Live Birth ___] : Age at live birth: [unfilled] [FreeTextEntry2] : 2 daughters [FreeTextEntry8] : x 2 months

## 2022-08-16 NOTE — CONSULT LETTER
[Dear  ___] : Dear  [unfilled], [Consult Letter:] : I had the pleasure of evaluating your patient, [unfilled]. [Sincerely,] : Sincerely, [DrSumaya  ___] : Dr. VALVERDE

## 2022-08-22 DIAGNOSIS — Z13.79 ENCOUNTER FOR OTHER SCREENING FOR GENETIC AND CHROMOSOMAL ANOMALIES: ICD-10-CM

## 2022-08-24 ENCOUNTER — NON-APPOINTMENT (OUTPATIENT)
Age: 57
End: 2022-08-24

## 2022-08-29 ENCOUNTER — APPOINTMENT (OUTPATIENT)
Dept: ULTRASOUND IMAGING | Facility: CLINIC | Age: 57
End: 2022-08-29

## 2022-08-29 ENCOUNTER — OUTPATIENT (OUTPATIENT)
Dept: OUTPATIENT SERVICES | Facility: HOSPITAL | Age: 57
LOS: 1 days | End: 2022-08-29
Payer: COMMERCIAL

## 2022-08-29 ENCOUNTER — APPOINTMENT (OUTPATIENT)
Dept: ULTRASOUND IMAGING | Facility: IMAGING CENTER | Age: 57
End: 2022-08-29

## 2022-08-29 DIAGNOSIS — C50.911 MALIGNANT NEOPLASM OF UNSPECIFIED SITE OF RIGHT FEMALE BREAST: ICD-10-CM

## 2022-08-29 PROCEDURE — C1739: CPT

## 2022-08-29 PROCEDURE — 19285 PERQ DEV BREAST 1ST US IMAG: CPT

## 2022-08-29 PROCEDURE — 19285 PERQ DEV BREAST 1ST US IMAG: CPT | Mod: RT

## 2022-09-13 ENCOUNTER — NON-APPOINTMENT (OUTPATIENT)
Age: 57
End: 2022-09-13

## 2022-09-14 ENCOUNTER — APPOINTMENT (OUTPATIENT)
Dept: GASTROENTEROLOGY | Facility: CLINIC | Age: 57
End: 2022-09-14

## 2022-09-14 VITALS
OXYGEN SATURATION: 97 % | HEIGHT: 62 IN | HEART RATE: 115 BPM | SYSTOLIC BLOOD PRESSURE: 100 MMHG | TEMPERATURE: 97.5 F | WEIGHT: 172.4 LBS | BODY MASS INDEX: 31.73 KG/M2 | DIASTOLIC BLOOD PRESSURE: 70 MMHG

## 2022-09-14 DIAGNOSIS — R19.7 DIARRHEA, UNSPECIFIED: ICD-10-CM

## 2022-09-14 PROCEDURE — 99214 OFFICE O/P EST MOD 30 MIN: CPT

## 2022-09-14 NOTE — ASSESSMENT
[FreeTextEntry1] : Impression and plan patient presents accompanied by her  to discuss recent diarrhea.  Diarrhea has been present for approximately 2 to 3 weeks since initiation of first round of chemotherapy.  The patient has been trying limited diet with some success including meats and starches.  Patient has been using Imodium several times a day with partial relief.  We spoke by phone with hematology.  I will obtain additional stool studies today to include GI PCR.  My suspicion is that diarrhea is related to chemotherapy and should settle down between treatments.  We had a lengthy discussion about the pros and cons of investigation including CT and/or colonoscopy but will hold off at this time.  Patient has my cell phone and will call me whenever necessary.

## 2022-09-14 NOTE — HISTORY OF PRESENT ILLNESS
[FreeTextEntry1] : Patient came to the office today for follow-up and discussion.  Patient had called me by phone to discuss recent diarrhea which began shortly after starting chemotherapy for breast cancer.  The patient is under care by Dr. Ortiz at [facility].  The patient has been experiencing 3-8 watery bowel movements per day some of which are admixed with small amounts of blood and mucus.  Patient has had poor appetite and is fearful to eat for worries that it might aggravate diarrhea.  She has been taking Imodium over-the-counter as required for relief.  Stool studies were performed and have been thus far negative.

## 2022-09-15 LAB — GI PCR PANEL: NOT DETECTED

## 2022-09-19 ENCOUNTER — NON-APPOINTMENT (OUTPATIENT)
Age: 57
End: 2022-09-19

## 2022-09-21 ENCOUNTER — NON-APPOINTMENT (OUTPATIENT)
Age: 57
End: 2022-09-21

## 2022-10-26 ENCOUNTER — APPOINTMENT (OUTPATIENT)
Dept: BREAST CENTER | Facility: CLINIC | Age: 57
End: 2022-10-26

## 2022-10-26 VITALS
SYSTOLIC BLOOD PRESSURE: 117 MMHG | DIASTOLIC BLOOD PRESSURE: 86 MMHG | BODY MASS INDEX: 29.26 KG/M2 | WEIGHT: 159 LBS | HEIGHT: 62 IN | HEART RATE: 91 BPM

## 2022-10-26 PROCEDURE — 99214 OFFICE O/P EST MOD 30 MIN: CPT

## 2022-10-26 RX ORDER — VANCOMYCIN HYDROCHLORIDE 125 MG/1
125 CAPSULE ORAL
Qty: 40 | Refills: 0 | Status: ACTIVE | COMMUNITY
Start: 2022-09-01

## 2022-10-26 RX ORDER — ONDANSETRON 8 MG/1
8 TABLET ORAL
Qty: 9 | Refills: 0 | Status: ACTIVE | COMMUNITY
Start: 2022-10-11

## 2022-10-26 NOTE — HISTORY OF PRESENT ILLNESS
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  She then had some erythema of her right lower extremity and although it didn't seem like another episode of phlebitis, she was advised to see an Oncologist to evaluate for an underlying malignancy.  She saw Dr. Bernardino Ortiz and had a CT of her chest , abdomen and pelvis.  She was found to have a right axillary node.  A biopsy of the node showed metastatic adenocarcinoma, possible breast primary.  She had a mammogram and breast ultrasound and a suspicious mass was found in the right breast.  A core biopsy confirmed the primary in the breast. PET/CT did  not show distant metastatic disease. A Saviscout was placed in the right axillary node.\par \par She began chemotherapy with TCHP with Dr. Ortiz, but changed Oncologists and is now seeing [place]. Milan [place] [facility].  She has gotten 3 cycles.  She has had a very difficult time with severe diarrhea.\par \par She is here with her  who is an Anesthesiologist at [employer].

## 2022-10-26 NOTE — PHYSICAL EXAM
[EOMI] : extra ocular movement intact [Sclera nonicteric] : sclera nonicteric [Supple] : supple [No Supraclavicular Adenopathy] : no supraclavicular adenopathy [No Cervical Adenopathy] : no cervical adenopathy [No Thyromegaly] : no thyromegaly [Clear to Auscultation Bilat] : clear to auscultation bilaterally [Normal Sinus Rhythm] : normal sinus rhythm [Normal S1, S2] : normal S1 and S2 [Examined in the supine and seated position] : examined in the supine and seated position [Bra Size: ___] : Bra Size: [unfilled] [No Axillary Lymphadenopathy] : no left axillary lymphadenopathy [Soft] : abdomen soft [Not Tender] : non-tender [No Palpable Masses] : no abdominal mass palpated [No dominant masses] : no dominant masses in right breast  [No dominant masses] : no dominant masses left breast [No Nipple Retraction] : no left nipple retraction [No Nipple Discharge] : no left nipple discharge [de-identified] : Previous 3 cm mobile node high axilla not clearly palpable. [de-identified] : Low transverse scar.

## 2022-10-26 NOTE — DATA REVIEWED
[FreeTextEntry1] : Bilateral mammogram 12/23/2021:  No evidence of malignancy.\par \par Bilateral ultrasound ([facility]) 6/22/2022:  No evidence of malignancy.\par \par CT abd/pelvis 7/20/2022:  Right adrenal nodule 18x16 mm (similar to 2/2020).  Several mesenteric lymph nodes borderline size.\par \par CT chest:  7/20/2022:  Right axillary node 36x14 mm. Right adrenal nodule.\par \par Pathology 8/4/2022:  Right axillary node= metastatic adenocarcinoma, favor breast, ER 95% SD <1% Her 2 3+\par \par [facility]  Small area of architectural distortion middle depth 12:00 on sixto. Small grouping of coarse heterogeneous microcalcifications 12:00 anterior to middle depth associated with distortion. \par \par \par Bilateral breast ultrasound 8/11/2022:  Right 12 N6 irregular isoechoic heterogeneous mass with angular margins and associated grouped calcifications correlates with mammogram [id number] mm.  Asymmetrical cortical thickening 9mm, of [id number] mm axillary node.\par \par PET/CT 8/11/2022:  FDG avid upper right breast nodule suspicious for malignancy. 2 prominent right axillary nodes with asymmetrical cortical thickening or mild FDG uptake, suspicious for metastasis.  Right adrenal nodule 1.8 cm.  9 mm left upper pole thyroid nodule.\par \par Thyroid ultrasound 8/10/2022:  Right lower pole stable nodule 9x5x8 mm, left mid upper pole stable 10x6x9 mm nodule.  New lower pole 11x6x9 mm nodule- and 6x4x8 mm nodule follow-up in 6 months.\par \par Pathology 8/18/2022:  Right 12N6= invasive well- mod diff with partial micropapillary features, % SD 50% Her 2 2+ FISH amplified\par \par Bilateral breast MRI 8/16/2022:  Irregular masslike area of enhancement 23x20 mm central anterior 12:00.  Inferior aspect with 2.5 cm clumped enhancement extending toward nipple. Approximately 5x3 mm focal enhancement anterior to middle depth lateral lower breast, follow-up in 6 months.  Enlarged pathologic lymph node 31x20 mm with at least 2-3 nodes involved.  Nonspecific left axillary nodes.\par \par \par

## 2022-11-29 ENCOUNTER — APPOINTMENT (OUTPATIENT)
Dept: GASTROENTEROLOGY | Facility: CLINIC | Age: 57
End: 2022-11-29

## 2022-11-29 VITALS
DIASTOLIC BLOOD PRESSURE: 70 MMHG | OXYGEN SATURATION: 99 % | HEART RATE: 83 BPM | TEMPERATURE: 97.1 F | SYSTOLIC BLOOD PRESSURE: 110 MMHG | BODY MASS INDEX: 28.34 KG/M2 | WEIGHT: 154 LBS | HEIGHT: 62 IN

## 2022-11-29 PROCEDURE — 99214 OFFICE O/P EST MOD 30 MIN: CPT

## 2022-11-29 NOTE — HISTORY OF PRESENT ILLNESS
[FreeTextEntry1] : Patient returns for follow-up and discussion.  Since patient's last visit I have been in touch with her by telephone.  Patient has recurrent C. difficile.  Patient had been feeling poorly and developed diarrhea.  Patient is undergoing chemotherapy for breast cancer and is concerned about upcoming dosage.  Patient was seen in the emergency room CT scanning was performed demonstrating colitis findings.  Please see attached.  Patient was started on Dificid and is now on day 7 of treatment.  Bowel movements are improving as well as symptoms.  Bowel movements have become more formed without blood or mucus.  Patient is reluctant to eat for fear of exacerbating her diarrhea.  Patient has had previous C. difficile in September and has had recent previous COVID infection.  Previous treatment with Dificid was successful with improvement in bowel function but no subsequent testing was performed.  During this visit I spoke with patient's oncologist on speaker phone with the patient and also coordinated a visit with infectious disease.

## 2022-11-29 NOTE — ASSESSMENT
[FreeTextEntry1] : Impression and plan\par Patient has recurrent C. difficile and is currently being treated with Dificid.  Recent previous infection in September was treated similarly.  She was doing well until recently.  Patient is scheduled for upcoming chemotherapy for breast cancer and is concerned about starting next round while sick with C. difficile.  I spoke with patient's oncologist and we agreed that we should hold off on next cycle of chemo until patient's infection has settled down.  I told patient to continue 10-day course of Dificid which should correspond nicely with infectious disease consultation.  I think that patient should be on a lingering course of vancomycin after completion of Dificid and hopefully sequential stool testing might resolve.  I will defer to infectious disease in this regard.  Patient was counseled that chemotherapy can result in diarrhea.  Patient also understands about the difficulty in eradicating C. difficile.  Patient will keep me posted regarding her progress and she may return here at any time

## 2022-12-01 ENCOUNTER — APPOINTMENT (OUTPATIENT)
Dept: INFECTIOUS DISEASE | Facility: CLINIC | Age: 57
End: 2022-12-01

## 2022-12-01 VITALS
HEIGHT: 62 IN | BODY MASS INDEX: 28.16 KG/M2 | RESPIRATION RATE: 17 BRPM | TEMPERATURE: 98.3 F | DIASTOLIC BLOOD PRESSURE: 80 MMHG | SYSTOLIC BLOOD PRESSURE: 122 MMHG | OXYGEN SATURATION: 99 % | WEIGHT: 153 LBS | HEART RATE: 82 BPM

## 2022-12-01 PROCEDURE — 99203 OFFICE O/P NEW LOW 30 MIN: CPT

## 2022-12-01 NOTE — HISTORY OF PRESENT ILLNESS
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 \par She had C diff about 3 years ago after taking Augmentin with good response to Dificid (no response to Vancomycin), on 8/25 had her frist chemo and had diarrhea with neg C diff but CT showed colitis and she had positive COVID at that time. Later had 2nd cycle on 9/27 and 10/17 with worsening of diarrhea, this time c diff was positive on 10/27, she was treated with dificid and later again had C diff on 11/22, for which is on day 9th of dificid treatment again. \par Currently diarrhea is impving but still has soft stool, no fever or abdominal pain. \par She is supposed to get her next cycle of chemo on 12/5 and then another one 3 weeks after that and then have breast surgery in january. \par Seen GI, had CT of abdomen showing pancolitis. \par GI recommended to come to ID to start prophylaxis for C diff.

## 2022-12-01 NOTE — ASSESSMENT
[FreeTextEntry1] : She had 3 c diff in the past and due to chemo, is very high risk for recurrence. \par She will needs extended treatment and tapering doses to prevent from recurrence. \par Usually after 10 days treatment of C diff with vancomycin or dificid we can start on Vancomycin 125mg twice daily and then start tapering based on her symptoms and risk factors. \par She will need an extended treatment and tapering due to ongoing chemo. \par She can delay chemo for one week at least if back to normal can restart chemo otherwise will need more delaying. \par FMT is not an option due to her active cancer and chemo. \par Will do some literature review about zinplava, if no contraindication, I recommend one dose to decrease the recurrence. \par No probiotic but can use plain yogurt. \par \par Patient was given the opportunity to ask questions and all questions were answered to their satisfaction.\par Counseling included lab results, differential diagnosis, treatment options, risks and benefits, lifestyle changes, current condition, medications and dose adjustments.\par The patient verbalized understanding.\par  [Treatment Education] : treatment education [Treatment Adherence] : treatment adherence [Rx Dose / Side Effects] : Rx dose/side effects [Risk Reduction] : risk reduction [Nutritional / Food Issues] : nutritional/food issues [Universal Precautions] : universal precautions [Drug Interactions / Side Effects] : drug interactions/side effects [Anticipatory Guidance] : anticipatory guidance

## 2022-12-13 ENCOUNTER — NON-APPOINTMENT (OUTPATIENT)
Age: 57
End: 2022-12-13

## 2022-12-14 ENCOUNTER — NON-APPOINTMENT (OUTPATIENT)
Age: 57
End: 2022-12-14

## 2022-12-21 ENCOUNTER — NON-APPOINTMENT (OUTPATIENT)
Age: 57
End: 2022-12-21

## 2022-12-23 ENCOUNTER — NON-APPOINTMENT (OUTPATIENT)
Age: 57
End: 2022-12-23

## 2022-12-27 ENCOUNTER — OUTPATIENT (OUTPATIENT)
Dept: OUTPATIENT SERVICES | Facility: HOSPITAL | Age: 57
LOS: 1 days | End: 2022-12-27
Payer: COMMERCIAL

## 2022-12-27 ENCOUNTER — APPOINTMENT (OUTPATIENT)
Dept: MRI IMAGING | Facility: CLINIC | Age: 57
End: 2022-12-27
Payer: COMMERCIAL

## 2022-12-27 DIAGNOSIS — C50.911 MALIGNANT NEOPLASM OF UNSPECIFIED SITE OF RIGHT FEMALE BREAST: ICD-10-CM

## 2022-12-27 DIAGNOSIS — Z00.8 ENCOUNTER FOR OTHER GENERAL EXAMINATION: ICD-10-CM

## 2022-12-27 PROCEDURE — A9585: CPT

## 2022-12-27 PROCEDURE — 77049 MRI BREAST C-+ W/CAD BI: CPT | Mod: 26

## 2022-12-27 PROCEDURE — C8908: CPT

## 2022-12-27 PROCEDURE — C8937: CPT

## 2022-12-30 ENCOUNTER — APPOINTMENT (OUTPATIENT)
Dept: BREAST CENTER | Facility: CLINIC | Age: 57
End: 2022-12-30
Payer: COMMERCIAL

## 2022-12-30 VITALS
SYSTOLIC BLOOD PRESSURE: 111 MMHG | WEIGHT: 154 LBS | DIASTOLIC BLOOD PRESSURE: 79 MMHG | BODY MASS INDEX: 28.34 KG/M2 | HEART RATE: 83 BPM | HEIGHT: 62 IN

## 2022-12-30 PROCEDURE — 99215 OFFICE O/P EST HI 40 MIN: CPT

## 2022-12-30 RX ORDER — HYDROCHLOROTHIAZIDE 12.5 MG/1
12.5 TABLET ORAL
Refills: 0 | Status: DISCONTINUED | COMMUNITY
End: 2022-12-30

## 2022-12-30 RX ORDER — AMLODIPINE BESYLATE 5 MG/1
5 TABLET ORAL
Refills: 0 | Status: DISCONTINUED | COMMUNITY
End: 2022-12-30

## 2022-12-30 NOTE — HISTORY OF PRESENT ILLNESS
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  She then had some erythema of her right lower extremity and although it didn't seem like another episode of phlebitis, she was advised to see an Oncologist to evaluate for an underlying malignancy.  She saw Dr. Bernardino Ortiz and had a CT of her chest , abdomen and pelvis in July 2022.  She was found to have a right axillary node.  A biopsy of the node showed metastatic adenocarcinoma, possible breast primary.  She had a mammogram and breast ultrasound and a suspicious mass was found in the right breast.  A core biopsy confirmed the primary in the breast. PET/CT did  not show distant metastatic disease. A Saviscout was placed in the right axillary node.\par \par She began chemotherapy with TCHP with Dr. Ortiz, but changed Oncologists and to Dr. Milan at [facility] [place].  She has gotten 5/6 planned cycles.  She had a very difficult time with severe diarrhea and the Perjeta was dropped with the last treatment.\par \par \par She met with Dr. Moreno to discuss options.\par \par She is here with her  who is an Anesthesiologist at [employer].

## 2022-12-30 NOTE — CONSULT LETTER
[Dear  ___] : Dear  [unfilled], [Consult Letter:] : I had the pleasure of evaluating your patient, [unfilled]. [Sincerely,] : Sincerely, [DrSumaya  ___] : Dr. VALVERDE [DrSumaya ___] : Dr. VALVERDE

## 2022-12-30 NOTE — DATA REVIEWED
[FreeTextEntry1] : Bilateral mammogram 12/23/2021:  No evidence of malignancy.\par \par Bilateral ultrasound ([facility]) 6/22/2022:  No evidence of malignancy.\par \par CT abd/pelvis 7/20/2022:  Right adrenal nodule 18x16 mm (similar to 2/2020).  Several mesenteric lymph nodes borderline size.\par \par CT chest:  7/20/2022:  Right axillary node 36x14 mm. Right adrenal nodule.\par \par Pathology 8/4/2022:  Right axillary node= metastatic adenocarcinoma, favor breast, ER 95% VA <1% Her 2 3+\par \par [facility]  Small area of architectural distortion middle depth 12:00 on sixto. Small grouping of coarse heterogeneous microcalcifications 12:00 anterior to middle depth associated with distortion. \par \par \par Bilateral breast ultrasound 8/11/2022:  Right 12 N6 irregular isoechoic heterogeneous mass with angular margins and associated grouped calcifications correlates with mammogram [id number] mm.  Asymmetrical cortical thickening 9mm, of 67j23g78 mm axillary node.\par \par PET/CT 8/11/2022:  FDG avid upper right breast nodule suspicious for malignancy. 2 prominent right axillary nodes with asymmetrical cortical thickening or mild FDG uptake, suspicious for metastasis.  Right adrenal nodule 1.8 cm.  9 mm left upper pole thyroid nodule.\par \par Thyroid ultrasound 8/10/2022:  Right lower pole stable nodule 9x5x8 mm, left mid upper pole stable 10x6x9 mm nodule.  New lower pole 11x6x9 mm nodule- and 6x4x8 mm nodule follow-up in 6 months.\par \par Pathology 8/18/2022:  Right 12N6= invasive well- mod diff with partial micropapillary features, % VA 50% Her 2 2+ FISH amplified\par \par Bilateral breast MRI 8/16/2022:  Irregular masslike area of enhancement 23x20 mm central anterior 12:00.  Inferior aspect with 2.5 cm clumped enhancement extending toward nipple. Approximately 5x3 mm focal enhancement anterior to middle depth lateral lower breast, follow-up in 6 months.  Enlarged pathologic lymph node 31x20 mm with at least 2-3 nodes involved.  Nonspecific left axillary nodes.\par \par Bilateral breast MRI 12/27/2022:  Superior right subtle region of distortion and enhancement up to 1.6 cm markedly decreased from prior 3 cm; normal fat containing right axillary lymph node with artifact at prior site of known positive node.

## 2022-12-30 NOTE — PHYSICAL EXAM
[EOMI] : extra ocular movement intact [Sclera nonicteric] : sclera nonicteric [Supple] : supple [No Supraclavicular Adenopathy] : no supraclavicular adenopathy [No Cervical Adenopathy] : no cervical adenopathy [No Thyromegaly] : no thyromegaly [Clear to Auscultation Bilat] : clear to auscultation bilaterally [Normal Sinus Rhythm] : normal sinus rhythm [Normal S1, S2] : normal S1 and S2 [Examined in the supine and seated position] : examined in the supine and seated position [Bra Size: ___] : Bra Size: [unfilled] [No dominant masses] : no dominant masses in right breast  [No dominant masses] : no dominant masses left breast [No Nipple Retraction] : no left nipple retraction [No Nipple Discharge] : no left nipple discharge [No Axillary Lymphadenopathy] : no left axillary lymphadenopathy [Soft] : abdomen soft [Not Tender] : non-tender [No Palpable Masses] : no abdominal mass palpated [de-identified] : Previous 3 cm mobile node high axilla not clearly palpable- tissue somewhat thickened. [de-identified] : Low transverse scar.

## 2023-01-03 ENCOUNTER — RESULT REVIEW (OUTPATIENT)
Age: 58
End: 2023-01-03

## 2023-01-18 ENCOUNTER — RESULT REVIEW (OUTPATIENT)
Age: 58
End: 2023-01-18

## 2023-01-18 ENCOUNTER — OUTPATIENT (OUTPATIENT)
Dept: OUTPATIENT SERVICES | Facility: HOSPITAL | Age: 58
LOS: 1 days | End: 2023-01-18
Payer: COMMERCIAL

## 2023-01-18 DIAGNOSIS — C50.911 MALIGNANT NEOPLASM OF UNSPECIFIED SITE OF RIGHT FEMALE BREAST: ICD-10-CM

## 2023-01-18 LAB — SURGICAL PATHOLOGY STUDY: SIGNIFICANT CHANGE UP

## 2023-01-18 PROCEDURE — 88321 CONSLTJ&REPRT SLD PREP ELSWR: CPT

## 2023-01-19 DIAGNOSIS — C50.911 MALIGNANT NEOPLASM OF UNSPECIFIED SITE OF RIGHT FEMALE BREAST: ICD-10-CM

## 2023-02-03 ENCOUNTER — OUTPATIENT (OUTPATIENT)
Dept: OUTPATIENT SERVICES | Facility: HOSPITAL | Age: 58
LOS: 1 days | End: 2023-02-03
Payer: COMMERCIAL

## 2023-02-03 ENCOUNTER — RESULT REVIEW (OUTPATIENT)
Age: 58
End: 2023-02-03

## 2023-02-03 VITALS
DIASTOLIC BLOOD PRESSURE: 85 MMHG | OXYGEN SATURATION: 98 % | TEMPERATURE: 98 F | SYSTOLIC BLOOD PRESSURE: 120 MMHG | RESPIRATION RATE: 18 BRPM | HEART RATE: 80 BPM

## 2023-02-03 DIAGNOSIS — Z98.890 OTHER SPECIFIED POSTPROCEDURAL STATES: Chronic | ICD-10-CM

## 2023-02-03 DIAGNOSIS — Z90.89 ACQUIRED ABSENCE OF OTHER ORGANS: Chronic | ICD-10-CM

## 2023-02-03 DIAGNOSIS — Z01.818 ENCOUNTER FOR OTHER PREPROCEDURAL EXAMINATION: ICD-10-CM

## 2023-02-03 LAB
ABO RH CONFIRMATION: SIGNIFICANT CHANGE UP
ALBUMIN SERPL ELPH-MCNC: 4 G/DL — SIGNIFICANT CHANGE UP (ref 3.3–5)
ALP SERPL-CCNC: 86 U/L — SIGNIFICANT CHANGE UP (ref 40–120)
ALT FLD-CCNC: 24 U/L — SIGNIFICANT CHANGE UP (ref 12–78)
ANION GAP SERPL CALC-SCNC: 4 MMOL/L — LOW (ref 5–17)
ANISOCYTOSIS BLD QL: SLIGHT — SIGNIFICANT CHANGE UP
APTT BLD: 32.4 SEC — SIGNIFICANT CHANGE UP (ref 27.5–35.5)
AST SERPL-CCNC: 12 U/L — LOW (ref 15–37)
BASOPHILS # BLD AUTO: 0.03 K/UL — SIGNIFICANT CHANGE UP (ref 0–0.2)
BASOPHILS NFR BLD AUTO: 1 % — SIGNIFICANT CHANGE UP (ref 0–2)
BILIRUB SERPL-MCNC: 0.6 MG/DL — SIGNIFICANT CHANGE UP (ref 0.2–1.2)
BLD GP AB SCN SERPL QL: SIGNIFICANT CHANGE UP
BUN SERPL-MCNC: 13 MG/DL — SIGNIFICANT CHANGE UP (ref 7–23)
CALCIUM SERPL-MCNC: 9.6 MG/DL — SIGNIFICANT CHANGE UP (ref 8.5–10.1)
CHLORIDE SERPL-SCNC: 105 MMOL/L — SIGNIFICANT CHANGE UP (ref 96–108)
CO2 SERPL-SCNC: 28 MMOL/L — SIGNIFICANT CHANGE UP (ref 22–31)
CREAT SERPL-MCNC: 0.74 MG/DL — SIGNIFICANT CHANGE UP (ref 0.5–1.3)
EGFR: 93 ML/MIN/1.73M2 — SIGNIFICANT CHANGE UP
ELLIPTOCYTES BLD QL SMEAR: SLIGHT — SIGNIFICANT CHANGE UP
EOSINOPHIL # BLD AUTO: 0 K/UL — SIGNIFICANT CHANGE UP (ref 0–0.5)
EOSINOPHIL NFR BLD AUTO: 0 % — SIGNIFICANT CHANGE UP (ref 0–6)
GLUCOSE SERPL-MCNC: 96 MG/DL — SIGNIFICANT CHANGE UP (ref 70–99)
HCT VFR BLD CALC: 34.8 % — SIGNIFICANT CHANGE UP (ref 34.5–45)
HGB BLD-MCNC: 11 G/DL — LOW (ref 11.5–15.5)
INR BLD: 1.09 RATIO — SIGNIFICANT CHANGE UP (ref 0.88–1.16)
LYMPHOCYTES # BLD AUTO: 1.93 K/UL — SIGNIFICANT CHANGE UP (ref 1–3.3)
LYMPHOCYTES # BLD AUTO: 64 % — HIGH (ref 13–44)
MANUAL SMEAR VERIFICATION: SIGNIFICANT CHANGE UP
MCHC RBC-ENTMCNC: 25.9 PG — LOW (ref 27–34)
MCHC RBC-ENTMCNC: 31.6 GM/DL — LOW (ref 32–36)
MCV RBC AUTO: 81.9 FL — SIGNIFICANT CHANGE UP (ref 80–100)
MONOCYTES # BLD AUTO: 0.33 K/UL — SIGNIFICANT CHANGE UP (ref 0–0.9)
MONOCYTES NFR BLD AUTO: 11 % — SIGNIFICANT CHANGE UP (ref 2–14)
NEUTROPHILS # BLD AUTO: 0.72 K/UL — LOW (ref 1.8–7.4)
NEUTROPHILS NFR BLD AUTO: 24 % — LOW (ref 43–77)
NRBC # BLD: 0 /100 — SIGNIFICANT CHANGE UP (ref 0–0)
NRBC # BLD: SIGNIFICANT CHANGE UP /100 WBCS (ref 0–0)
OVALOCYTES BLD QL SMEAR: SLIGHT — SIGNIFICANT CHANGE UP
PLAT MORPH BLD: NORMAL — SIGNIFICANT CHANGE UP
PLATELET # BLD AUTO: 222 K/UL — SIGNIFICANT CHANGE UP (ref 150–400)
POIKILOCYTOSIS BLD QL AUTO: SLIGHT — SIGNIFICANT CHANGE UP
POTASSIUM SERPL-MCNC: 3.8 MMOL/L — SIGNIFICANT CHANGE UP (ref 3.5–5.3)
POTASSIUM SERPL-SCNC: 3.8 MMOL/L — SIGNIFICANT CHANGE UP (ref 3.5–5.3)
PROT SERPL-MCNC: 7.3 GM/DL — SIGNIFICANT CHANGE UP (ref 6–8.3)
PROTHROM AB SERPL-ACNC: 12.7 SEC — SIGNIFICANT CHANGE UP (ref 10.5–13.4)
RBC # BLD: 4.25 M/UL — SIGNIFICANT CHANGE UP (ref 3.8–5.2)
RBC # FLD: 15.3 % — HIGH (ref 10.3–14.5)
RBC BLD AUTO: ABNORMAL
SODIUM SERPL-SCNC: 137 MMOL/L — SIGNIFICANT CHANGE UP (ref 135–145)
WBC # BLD: 3.01 K/UL — LOW (ref 3.8–10.5)
WBC # FLD AUTO: 3.01 K/UL — LOW (ref 3.8–10.5)

## 2023-02-03 PROCEDURE — 86850 RBC ANTIBODY SCREEN: CPT

## 2023-02-03 PROCEDURE — 71046 X-RAY EXAM CHEST 2 VIEWS: CPT | Mod: 26

## 2023-02-03 PROCEDURE — 85730 THROMBOPLASTIN TIME PARTIAL: CPT

## 2023-02-03 PROCEDURE — 80053 COMPREHEN METABOLIC PANEL: CPT

## 2023-02-03 PROCEDURE — 93005 ELECTROCARDIOGRAM TRACING: CPT

## 2023-02-03 PROCEDURE — 86900 BLOOD TYPING SEROLOGIC ABO: CPT

## 2023-02-03 PROCEDURE — 71046 X-RAY EXAM CHEST 2 VIEWS: CPT

## 2023-02-03 PROCEDURE — 85025 COMPLETE CBC W/AUTO DIFF WBC: CPT

## 2023-02-03 PROCEDURE — 86901 BLOOD TYPING SEROLOGIC RH(D): CPT

## 2023-02-03 PROCEDURE — 36415 COLL VENOUS BLD VENIPUNCTURE: CPT

## 2023-02-03 PROCEDURE — 85610 PROTHROMBIN TIME: CPT

## 2023-02-03 PROCEDURE — 93010 ELECTROCARDIOGRAM REPORT: CPT

## 2023-02-03 NOTE — PATIENT PROFILE ADULT - FALL HARM RISK - FALL HARM RISK
Follow up in approximately 6 weeks   Goals:  Food log (ie ) www myfitnesspal com,sparkpeople  com,loseit com,calorieking  com,etc  baritastic  No sugary beverages  At least 64oz of water daily  Increase physical activity by 10 minutes daily   Gradually increase physical activity to a goal of 5 days per week for 30 minutes of MODERATE intensity PLUS 2 days per week of FULL BODY resistance training  5-10 servings of fruits and vegetables per day and 25-35 grams of dietary fiber per day, gradually increasing  [id number] calories a day No indicators present

## 2023-02-03 NOTE — CHART NOTE - NSCHARTNOTEFT_GEN_A_CORE
2/3/23 PST - 57 year old female scheduled for magseed localized right breast lumpectomy with leonardo , localized right axillary node biopsy, sentinal node biopsy, possible axillary dissection.   Plan:  1. PST instructions given ; NPO status/  instructions to be given by ASU   2. Pt instructed to take following meds on day of surgery: vancomycin   3. Pt instructed to take routine evening medications unless indicated   4. Stop NSAIDS ( Aspirin Alev Motrin Mobic Diclofenac), herbal supplements , MVI , Vitamin fish oil 7 days prior to surgery  unless   directed by surgeon or cardiologist;   5. Medical Optimization  with Dr. Chelsi Goodman - [phone]  6. EZ wash instructions given & mupirocin instructions given  7. Labs EKG CXR as per surgeon request   8. Pt instructed to self quarantine after Covid test   9. Covid Testing scheduled 2/10/23 Pt notified and aware  10. Pt denies covid symptoms shortness of breath fever cough 2/3/23 PST - 57 year old female scheduled for magseed localized right breast lumpectomy with leonardo , localized right axillary node biopsy, sentinal node biopsy, possible axillary dissection.   VS: 97.9 orally, HR 85, /82, RR 18, 98% RA.   Plan:  1. PST instructions given ; NPO status/  instructions to be given by ASU   2. Pt instructed to take following meds on day of surgery: vancomycin   3. Pt instructed to take routine evening medications unless indicated   4. Stop NSAIDS ( Aspirin Alev Motrin Mobic Diclofenac), herbal supplements , MVI , Vitamin fish oil 7 days prior to surgery  unless   directed by surgeon or cardiologist;   5. Medical Optimization  with Dr. Chelsi Goodman - [id number]  6. EZ wash instructions given  7. Labs EKG CXR as per surgeon request   8. Pt instructed to self quarantine after Covid test   9. Covid Testing scheduled 2/10/23 Pt notified and aware  10. Pt denies covid symptoms shortness of breath fever cough 2/3/23 PST - 57 year old female scheduled for magseed localized right breast lumpectomy with leonardo , localized right axillary node biopsy, sentinal node biopsy, possible axillary dissection and bilateral oncoplastic breast reduction.   VS: 97.9 orally, HR 85, /82, RR 18, 98% RA.   Plan:  1. PST instructions given ; NPO status/  instructions to be given by ASU   2. Pt instructed to take following meds on day of surgery: vancomycin   3. Pt instructed to take routine evening medications unless indicated   4. Stop NSAIDS ( Aspirin Alev Motrin Mobic Diclofenac), herbal supplements , MVI , Vitamin fish oil 7 days prior to surgery  unless   directed by surgeon or cardiologist;   5. Medical Optimization  with Dr. Chelsi Goodman - [phone]  6. EZ wash instructions given  7. Labs EKG CXR as per surgeon request   8. Pt instructed to self quarantine after Covid test   9. Covid Testing scheduled 2/10/23 Pt notified and aware  10. Pt denies covid symptoms shortness of breath fever cough

## 2023-02-03 NOTE — PATIENT PROFILE ADULT - FALL HARM RISK - UNIVERSAL INTERVENTIONS
Bed in lowest position, wheels locked, appropriate side rails in place/Call bell, personal items and telephone in reach/Instruct patient to call for assistance before getting out of bed or chair/Non-slip footwear when patient is out of bed/[place] to call system/Physically safe environment - no spills, clutter or unnecessary equipment/Purposeful Proactive Rounding/Room/bathroom lighting operational, light cord in reach

## 2023-02-04 DIAGNOSIS — Z01.818 ENCOUNTER FOR OTHER PREPROCEDURAL EXAMINATION: ICD-10-CM

## 2023-02-06 ENCOUNTER — APPOINTMENT (OUTPATIENT)
Dept: ULTRASOUND IMAGING | Facility: CLINIC | Age: 58
End: 2023-02-06
Payer: COMMERCIAL

## 2023-02-06 ENCOUNTER — OUTPATIENT (OUTPATIENT)
Dept: OUTPATIENT SERVICES | Facility: HOSPITAL | Age: 58
LOS: 1 days | End: 2023-02-06
Payer: COMMERCIAL

## 2023-02-06 DIAGNOSIS — Z98.890 OTHER SPECIFIED POSTPROCEDURAL STATES: Chronic | ICD-10-CM

## 2023-02-06 DIAGNOSIS — Z00.8 ENCOUNTER FOR OTHER GENERAL EXAMINATION: ICD-10-CM

## 2023-02-06 DIAGNOSIS — C50.911 MALIGNANT NEOPLASM OF UNSPECIFIED SITE OF RIGHT FEMALE BREAST: ICD-10-CM

## 2023-02-06 DIAGNOSIS — Z90.89 ACQUIRED ABSENCE OF OTHER ORGANS: Chronic | ICD-10-CM

## 2023-02-06 PROBLEM — I10 ESSENTIAL (PRIMARY) HYPERTENSION: Chronic | Status: ACTIVE | Noted: 2023-02-03

## 2023-02-06 PROBLEM — A04.72 ENTEROCOLITIS DUE TO CLOSTRIDIUM DIFFICILE, NOT SPECIFIED AS RECURRENT: Chronic | Status: ACTIVE | Noted: 2023-02-03

## 2023-02-06 PROBLEM — J45.909 UNSPECIFIED ASTHMA, UNCOMPLICATED: Chronic | Status: ACTIVE | Noted: 2023-02-03

## 2023-02-06 PROBLEM — G47.33 OBSTRUCTIVE SLEEP APNEA (ADULT) (PEDIATRIC): Chronic | Status: ACTIVE | Noted: 2023-02-03

## 2023-02-06 PROBLEM — C50.919 MALIGNANT NEOPLASM OF UNSPECIFIED SITE OF UNSPECIFIED FEMALE BREAST: Chronic | Status: ACTIVE | Noted: 2023-02-03

## 2023-02-06 PROBLEM — E78.00 PURE HYPERCHOLESTEROLEMIA, UNSPECIFIED: Chronic | Status: ACTIVE | Noted: 2023-02-03

## 2023-02-06 PROCEDURE — 19285 PERQ DEV BREAST 1ST US IMAG: CPT | Mod: RT

## 2023-02-06 PROCEDURE — 19285 PERQ DEV BREAST 1ST US IMAG: CPT

## 2023-02-08 ENCOUNTER — APPOINTMENT (OUTPATIENT)
Dept: INTERNAL MEDICINE | Facility: CLINIC | Age: 58
End: 2023-02-08
Payer: COMMERCIAL

## 2023-02-08 ENCOUNTER — LABORATORY RESULT (OUTPATIENT)
Age: 58
End: 2023-02-08

## 2023-02-08 VITALS
OXYGEN SATURATION: 99 % | DIASTOLIC BLOOD PRESSURE: 84 MMHG | TEMPERATURE: 97.6 F | BODY MASS INDEX: 28.34 KG/M2 | WEIGHT: 154 LBS | HEART RATE: 82 BPM | HEIGHT: 62 IN | SYSTOLIC BLOOD PRESSURE: 125 MMHG

## 2023-02-08 PROCEDURE — 99215 OFFICE O/P EST HI 40 MIN: CPT

## 2023-02-09 LAB
CHOLEST SERPL-MCNC: 203 MG/DL
ESTIMATED AVERAGE GLUCOSE: 117 MG/DL
HBA1C MFR BLD HPLC: 5.7 %
HDLC SERPL-MCNC: 38 MG/DL
LDLC SERPL CALC-MCNC: 133 MG/DL
NONHDLC SERPL-MCNC: 165 MG/DL
TRIGL SERPL-MCNC: 160 MG/DL

## 2023-02-09 RX ORDER — BEZLOTOXUMAB 25 MG/ML
1000 INJECTION, SOLUTION INTRAVENOUS
Qty: 1 | Refills: 0 | Status: COMPLETED | OUTPATIENT
Start: 2022-12-01 | End: 2023-02-09

## 2023-02-09 RX ORDER — DEXAMETHASONE 4 MG/1
4 TABLET ORAL
Qty: 30 | Refills: 0 | Status: COMPLETED | COMMUNITY
Start: 2022-10-17 | End: 2023-02-09

## 2023-02-09 NOTE — HISTORY OF PRESENT ILLNESS
[No Pertinent Cardiac History] : no history of aortic stenosis, atrial fibrillation, coronary artery disease, recent myocardial infarction, or implantable device/pacemaker [No Pertinent Pulmonary History] : no history of asthma, COPD, sleep apnea, or smoking [No Adverse Anesthesia Reaction] : no adverse anesthesia reaction in self or family member [(Patient denies any chest pain, claudication, dyspnea on exertion, orthopnea, palpitations or syncope)] : Patient denies any chest pain, claudication, dyspnea on exertion, orthopnea, palpitations or syncope [Sleep Apnea] : sleep apnea [Chronic Anticoagulation] : no chronic anticoagulation [Chronic Kidney Disease] : no chronic kidney disease [Diabetes] : no diabetes [FreeTextEntry1] : Right breast lumpectomy with possible axillary node dissection.  [FreeTextEntry2] : 2/13/2023 [FreeTextEntry3] : Dr. Jackeline Schwartz [FreeTextEntry4] : Patient is a 57-year-old female with past medical history significant for breast cancer with metastasis to right axilla, recurrent C. diff diarrhea hypertension, hyperlipidemia, sleep apnea, prediabetes, presents for medical optimization in anticipation of right breast lumpectomy with possible LN dissection. \par Patient under care of oncologist and undergoing chemotherapy. with oncologist at [facility]. Has required  treatment for neutropenia with Neulasta. \par Patient denies any symptoms of chest pain, shortness of breath, palpitations, or change in exercise tolerance. Denies fever, cough, urinary frequency, urgency, dysuria or recent diarrhea.\par Has lost approximately 25 lbs since starting chemo.\par She remains on Vancomycin due to h/o recurrent C. diff. S/p  Zinplava infusion on 12/28/22. Admits to intermittent loose bowels but denies recent diarrhea. \par Patient reports that she stopped lipid lowering medication over 2 months. \par Also discontinued Norvasc several months ago. Has been monitoring BP at home. Has noted that over the past 2 weeks readings have been running a bit higher and she has resumed taking the medication intermittently. Recent echo reportedly normal.\par Scheduled for COVID19 PCR test on 2/10 as required by surgical team. \par \par \par

## 2023-02-09 NOTE — PHYSICAL EXAM
[Normal Oropharynx] : the oropharynx was normal [Normal TMs] : both tympanic membranes were normal [No Carotid Bruits] : no carotid bruits [No Edema] : there was no peripheral edema [Normal] : soft, non-tender, non-distended, no masses palpated, no HSM and normal bowel sounds [Normal Posterior Cervical Nodes] : no posterior cervical lymphadenopathy [Normal Anterior Cervical Nodes] : no anterior cervical lymphadenopathy [Grossly Normal Strength/Tone] : grossly normal strength/tone [No Rash] : no rash [No Focal Deficits] : no focal deficits [Normal Affect] : the affect was normal [Normal Insight/Judgement] : insight and judgment were intact

## 2023-02-09 NOTE — ASSESSMENT
[Patient Optimized for Surgery] : Patient optimized for surgery [No Further Testing Recommended] : no further testing recommended [As per surgery] : as per surgery [FreeTextEntry4] : Patient is a 57-year-old female with past medical history significant for breast cancer with metastasis to right axilla, recurrent C. diff diarrhea hypertension, hyperlipidemia, sleep apnea, prediabetes, presents for medical optimization in anticipation of right breast lumpectomy with possible LN dissection. \par \par -Patient will avoid aspirin, NSAIDs, and over-the-counter vitamins and supplements 1 week prior to surgery.\par -COVID-19 PCR testing scheduled for 2/10/2023.\par -Moderate neutropenia on labs.  Patient clinically stable and asymptomatic.  Monitor closely for any signs and symptoms of infection perioperatively.\par -DVT/GI prophylaxis as per surgical team\par -Hypertension: Patient instructed to remain compliant with taking amlodipine 2.5 mg daily perioperatively.

## 2023-02-09 NOTE — RESULTS/DATA
[] : results reviewed [de-identified] : WBC = 3.01, percent neutrophil = 24 [de-identified] : Within normal limits [de-identified] : Within normal limits [de-identified] : Chest x-ray: No evidence of active chest disease\par PFTs: Spirometry is normal, lung volumes are normal, normal DLCO\par EKG: Normal sinus rhythm at 70 bpm, within normal limits\par

## 2023-02-10 RX ORDER — ONDANSETRON 8 MG/1
4 TABLET, FILM COATED ORAL ONCE
Refills: 0 | Status: DISCONTINUED | OUTPATIENT
Start: 2023-02-13 | End: 2023-02-13

## 2023-02-10 RX ORDER — SODIUM CHLORIDE 9 MG/ML
1000 INJECTION, SOLUTION INTRAVENOUS
Refills: 0 | Status: DISCONTINUED | OUTPATIENT
Start: 2023-02-13 | End: 2023-02-13

## 2023-02-10 RX ORDER — FENTANYL CITRATE 50 UG/ML
50 INJECTION INTRAVENOUS
Refills: 0 | Status: DISCONTINUED | OUTPATIENT
Start: 2023-02-13 | End: 2023-02-13

## 2023-02-13 ENCOUNTER — TRANSCRIPTION ENCOUNTER (OUTPATIENT)
Age: 58
End: 2023-02-13

## 2023-02-13 ENCOUNTER — APPOINTMENT (OUTPATIENT)
Dept: BREAST CENTER | Facility: HOSPITAL | Age: 58
End: 2023-02-13

## 2023-02-13 ENCOUNTER — RESULT REVIEW (OUTPATIENT)
Age: 58
End: 2023-02-13

## 2023-02-13 ENCOUNTER — OUTPATIENT (OUTPATIENT)
Dept: INPATIENT UNIT | Facility: HOSPITAL | Age: 58
LOS: 1 days | Discharge: HOME | End: 2023-02-13
Payer: COMMERCIAL

## 2023-02-13 VITALS
HEART RATE: 73 BPM | OXYGEN SATURATION: 100 % | HEIGHT: 62 IN | RESPIRATION RATE: 15 BRPM | TEMPERATURE: 98 F | DIASTOLIC BLOOD PRESSURE: 83 MMHG | WEIGHT: 154.1 LBS | SYSTOLIC BLOOD PRESSURE: 126 MMHG

## 2023-02-13 VITALS
SYSTOLIC BLOOD PRESSURE: 122 MMHG | OXYGEN SATURATION: 100 % | RESPIRATION RATE: 17 BRPM | HEART RATE: 73 BPM | DIASTOLIC BLOOD PRESSURE: 72 MMHG | TEMPERATURE: 98 F

## 2023-02-13 DIAGNOSIS — Z90.89 ACQUIRED ABSENCE OF OTHER ORGANS: Chronic | ICD-10-CM

## 2023-02-13 DIAGNOSIS — Z98.890 OTHER SPECIFIED POSTPROCEDURAL STATES: Chronic | ICD-10-CM

## 2023-02-13 DIAGNOSIS — C50.911 MALIGNANT NEOPLASM OF UNSPECIFIED SITE OF RIGHT FEMALE BREAST: ICD-10-CM

## 2023-02-13 PROCEDURE — 88307 TISSUE EXAM BY PATHOLOGIST: CPT

## 2023-02-13 PROCEDURE — 76098 X-RAY EXAM SURGICAL SPECIMEN: CPT | Mod: 26

## 2023-02-13 PROCEDURE — 19301 PARTIAL MASTECTOMY: CPT

## 2023-02-13 PROCEDURE — 38900 IO MAP OF SENT LYMPH NODE: CPT

## 2023-02-13 PROCEDURE — 76098 X-RAY EXAM SURGICAL SPECIMEN: CPT

## 2023-02-13 PROCEDURE — 88342 IMHCHEM/IMCYTCHM 1ST ANTB: CPT | Mod: 26

## 2023-02-13 PROCEDURE — 88342 IMHCHEM/IMCYTCHM 1ST ANTB: CPT

## 2023-02-13 PROCEDURE — 88331 PATH CONSLTJ SURG 1 BLK 1SPC: CPT

## 2023-02-13 PROCEDURE — 88332 PATH CONSLTJ SURG EA ADD BLK: CPT

## 2023-02-13 PROCEDURE — 88305 TISSUE EXAM BY PATHOLOGIST: CPT

## 2023-02-13 PROCEDURE — 38792 RA TRACER ID OF SENTINL NODE: CPT | Mod: RT,59

## 2023-02-13 PROCEDURE — 88334 PATH CONSLTJ SURG CYTO XM EA: CPT

## 2023-02-13 PROCEDURE — C9290: CPT

## 2023-02-13 PROCEDURE — 88307 TISSUE EXAM BY PATHOLOGIST: CPT | Mod: 26

## 2023-02-13 PROCEDURE — A9520: CPT

## 2023-02-13 PROCEDURE — 88331 PATH CONSLTJ SURG 1 BLK 1SPC: CPT | Mod: 26

## 2023-02-13 PROCEDURE — 88305 TISSUE EXAM BY PATHOLOGIST: CPT | Mod: 26

## 2023-02-13 PROCEDURE — 88333 PATH CONSLTJ SURG CYTO XM 1: CPT

## 2023-02-13 PROCEDURE — 88334 PATH CONSLTJ SURG CYTO XM EA: CPT | Mod: 26,59

## 2023-02-13 PROCEDURE — 38525 BIOPSY/REMOVAL LYMPH NODES: CPT

## 2023-02-13 PROCEDURE — 88332 PATH CONSLTJ SURG EA ADD BLK: CPT | Mod: 26

## 2023-02-13 RX ORDER — AMLODIPINE BESYLATE 2.5 MG/1
1 TABLET ORAL
Qty: 0 | Refills: 0 | DISCHARGE

## 2023-02-13 RX ORDER — VANCOMYCIN HCL 1 G
1 VIAL (EA) INTRAVENOUS
Qty: 0 | Refills: 0 | DISCHARGE

## 2023-02-13 RX ORDER — OXYCODONE HYDROCHLORIDE 5 MG/1
5 TABLET ORAL ONCE
Refills: 0 | Status: DISCONTINUED | OUTPATIENT
Start: 2023-02-13 | End: 2023-02-13

## 2023-02-13 RX ORDER — CHOLECALCIFEROL (VITAMIN D3) 125 MCG
1000 CAPSULE ORAL
Qty: 0 | Refills: 0 | DISCHARGE

## 2023-02-13 RX ORDER — ACETAMINOPHEN 500 MG
1000 TABLET ORAL ONCE
Refills: 0 | Status: DISCONTINUED | OUTPATIENT
Start: 2023-02-13 | End: 2023-02-13

## 2023-02-13 RX ORDER — HYDROMORPHONE HYDROCHLORIDE 2 MG/ML
0.25 INJECTION INTRAMUSCULAR; INTRAVENOUS; SUBCUTANEOUS
Refills: 0 | Status: DISCONTINUED | OUTPATIENT
Start: 2023-02-13 | End: 2023-02-13

## 2023-02-13 RX ORDER — MONTELUKAST 4 MG/1
1 TABLET, CHEWABLE ORAL
Qty: 0 | Refills: 0 | DISCHARGE

## 2023-02-13 RX ADMIN — FENTANYL CITRATE 50 MICROGRAM(S): 50 INJECTION INTRAVENOUS at 13:11

## 2023-02-13 RX ADMIN — OXYCODONE HYDROCHLORIDE 5 MILLIGRAM(S): 5 TABLET ORAL at 13:00

## 2023-02-13 RX ADMIN — FENTANYL CITRATE 50 MICROGRAM(S): 50 INJECTION INTRAVENOUS at 13:14

## 2023-02-13 RX ADMIN — FENTANYL CITRATE 50 MICROGRAM(S): 50 INJECTION INTRAVENOUS at 14:04

## 2023-02-13 RX ADMIN — OXYCODONE HYDROCHLORIDE 5 MILLIGRAM(S): 5 TABLET ORAL at 13:14

## 2023-02-13 RX ADMIN — HYDROMORPHONE HYDROCHLORIDE 0.25 MILLIGRAM(S): 2 INJECTION INTRAMUSCULAR; INTRAVENOUS; SUBCUTANEOUS at 13:45

## 2023-02-13 RX ADMIN — HYDROMORPHONE HYDROCHLORIDE 0.25 MILLIGRAM(S): 2 INJECTION INTRAMUSCULAR; INTRAVENOUS; SUBCUTANEOUS at 14:05

## 2023-02-13 RX ADMIN — SODIUM CHLORIDE 100 MILLILITER(S): 9 INJECTION, SOLUTION INTRAVENOUS at 12:22

## 2023-02-13 RX ADMIN — FENTANYL CITRATE 50 MICROGRAM(S): 50 INJECTION INTRAVENOUS at 12:22

## 2023-02-13 NOTE — ASU DISCHARGE PLAN (ADULT/PEDIATRIC) - CARE PROVIDER_API CALL
Omkar Moreno)  Plastic Surgery  [address], [address]  [place] [address]  Phone: [phone]  Fax: [phone]  Scheduled Appointment: 02/16/2023

## 2023-02-13 NOTE — ASU PATIENT PROFILE, ADULT - NSICDXPASTMEDICALHX_GEN_ALL_CORE_FT
PAST MEDICAL HISTORY:  Asthma     Breast cancer     Clostridium difficile colitis     High cholesterol     Hypertension     LAKSHMI (obstructive sleep apnea)

## 2023-02-13 NOTE — ASU PATIENT PROFILE, ADULT - FALL HARM RISK - UNIVERSAL INTERVENTIONS
Bed in lowest position, wheels locked, appropriate side rails in place/Call bell, personal items and telephone in reach/Instruct patient to call for assistance before getting out of bed or chair/Non-slip footwear when patient is out of bed/[place] to call system/Physically safe environment - no spills, clutter or unnecessary equipment/Purposeful Proactive Rounding/Room/bathroom lighting operational, light cord in reach

## 2023-02-13 NOTE — BRIEF OPERATIVE NOTE - NSICDXBRIEFPREOP_GEN_ALL_CORE_FT
PRE-OP DIAGNOSIS:  Breast cancer, right 13-Feb-2023 11:05:46  Nella Oakes  
PRE-OP DIAGNOSIS:  Breast cancer, right 13-Feb-2023 11:05:46  Nella Oakes

## 2023-02-13 NOTE — ASU PATIENT PROFILE, ADULT - NSICDXPASTSURGICALHX_GEN_ALL_CORE_FT
PAST SURGICAL HISTORY:  History of removal of cyst abdominal wall    History of tonsillectomy     S/P excision of lipoma back    S/P removal of ovarian cyst

## 2023-02-13 NOTE — BRIEF OPERATIVE NOTE - OPERATION/FINDINGS
as dictated
SLN 1 with saviscout, radioactive, blue; SLN 2 radioactive, SLN 3 blue- all negative on touch prep; SLN 1 negative on frozen  Magseed and clip seen on specimen image of lumpectomy

## 2023-02-13 NOTE — BRIEF OPERATIVE NOTE - NSICDXBRIEFPROCEDURE_GEN_ALL_CORE_FT
PROCEDURES:  Reduction, breast, using oncoplastic surgery 13-Feb-2023 12:06:03  Douglas Saez  
PROCEDURES:  Lumpectomy of right breast with sentinel node biopsy 13-Feb-2023 11:05:28  Nella Oakes

## 2023-02-13 NOTE — BRIEF OPERATIVE NOTE - NSICDXBRIEFPOSTOP_GEN_ALL_CORE_FT
POST-OP DIAGNOSIS:  Breast cancer, right 13-Feb-2023 11:06:18  Nella Oakes  
POST-OP DIAGNOSIS:  Breast cancer, right 13-Feb-2023 11:06:18  Nella Oakes

## 2023-02-13 NOTE — ASU DISCHARGE PLAN (ADULT/PEDIATRIC) - NS MD DC FALL RISK RISK
For information on Fall & Injury Prevention, visit: [email or web address] OR  [email or web address] OR  https://www.cdc.gov/steadi/patient.html

## 2023-02-14 ENCOUNTER — NON-APPOINTMENT (OUTPATIENT)
Age: 58
End: 2023-02-14

## 2023-02-16 LAB — SURGICAL PATHOLOGY STUDY: SIGNIFICANT CHANGE UP

## 2023-02-19 DIAGNOSIS — J45.909 UNSPECIFIED ASTHMA, UNCOMPLICATED: ICD-10-CM

## 2023-02-19 DIAGNOSIS — C77.3 SECONDARY AND UNSPECIFIED MALIGNANT NEOPLASM OF AXILLA AND UPPER LIMB LYMPH NODES: ICD-10-CM

## 2023-02-19 DIAGNOSIS — Z79.82 LONG TERM (CURRENT) USE OF ASPIRIN: ICD-10-CM

## 2023-02-19 DIAGNOSIS — I10 ESSENTIAL (PRIMARY) HYPERTENSION: ICD-10-CM

## 2023-02-19 DIAGNOSIS — K21.9 GASTRO-ESOPHAGEAL REFLUX DISEASE WITHOUT ESOPHAGITIS: ICD-10-CM

## 2023-02-19 DIAGNOSIS — C50.911 MALIGNANT NEOPLASM OF UNSPECIFIED SITE OF RIGHT FEMALE BREAST: ICD-10-CM

## 2023-02-19 DIAGNOSIS — Z80.3 FAMILY HISTORY OF MALIGNANT NEOPLASM OF BREAST: ICD-10-CM

## 2023-02-19 DIAGNOSIS — N65.1 DISPROPORTION OF RECONSTRUCTED BREAST: ICD-10-CM

## 2023-02-19 DIAGNOSIS — Z80.0 FAMILY HISTORY OF MALIGNANT NEOPLASM OF DIGESTIVE ORGANS: ICD-10-CM

## 2023-02-19 DIAGNOSIS — Z17.0 ESTROGEN RECEPTOR POSITIVE STATUS [ER+]: ICD-10-CM

## 2023-02-19 DIAGNOSIS — Z92.21 PERSONAL HISTORY OF ANTINEOPLASTIC CHEMOTHERAPY: ICD-10-CM

## 2023-02-22 ENCOUNTER — APPOINTMENT (OUTPATIENT)
Dept: BREAST CENTER | Facility: CLINIC | Age: 58
End: 2023-02-22
Payer: COMMERCIAL

## 2023-02-22 VITALS
HEART RATE: 74 BPM | SYSTOLIC BLOOD PRESSURE: 111 MMHG | WEIGHT: 154 LBS | HEIGHT: 62 IN | BODY MASS INDEX: 28.34 KG/M2 | DIASTOLIC BLOOD PRESSURE: 80 MMHG

## 2023-02-22 DIAGNOSIS — Z09 ENCOUNTER FOR FOLLOW-UP EXAMINATION AFTER COMPLETED TREATMENT FOR CONDITIONS OTHER THAN MALIGNANT NEOPLASM: ICD-10-CM

## 2023-02-22 PROCEDURE — 99024 POSTOP FOLLOW-UP VISIT: CPT

## 2023-02-22 RX ORDER — ATORVASTATIN CALCIUM 20 MG/1
20 TABLET, FILM COATED ORAL
Qty: 90 | Refills: 2 | Status: DISCONTINUED | COMMUNITY
Start: 2020-02-26 | End: 2023-02-22

## 2023-02-22 RX ORDER — SENNOSIDES 8.6 MG
TABLET ORAL
Refills: 0 | Status: DISCONTINUED | COMMUNITY
End: 2023-02-22

## 2023-02-22 RX ORDER — OMEGA-3/DHA/EPA/FISH OIL 300-1000MG
1000 CAPSULE ORAL DAILY
Refills: 0 | Status: DISCONTINUED | COMMUNITY
Start: 2019-02-14 | End: 2023-02-22

## 2023-02-22 RX ORDER — ASPIRIN 81 MG
81 TABLET, DELAYED RELEASE (ENTERIC COATED) ORAL
Refills: 0 | Status: DISCONTINUED | COMMUNITY
End: 2023-02-22

## 2023-02-22 RX ORDER — DIPHENOXYLATE HYDROCHLORIDE AND ATROPINE SULFATE 2.5; .025 MG/1; MG/1
2.5-0.025 TABLET ORAL
Qty: 240 | Refills: 0 | Status: DISCONTINUED | COMMUNITY
Start: 2022-10-17 | End: 2023-02-22

## 2023-02-22 NOTE — DATA REVIEWED
[FreeTextEntry1] : Bilateral mammogram 12/23/2021:  No evidence of malignancy.\par \par Bilateral ultrasound ([facility]) 6/22/2022:  No evidence of malignancy.\par \par CT abd/pelvis 7/20/2022:  Right adrenal nodule 18x16 mm (similar to 2/2020).  Several mesenteric lymph nodes borderline size.\par \par CT chest:  7/20/2022:  Right axillary node 36x14 mm. Right adrenal nodule.\par \par Pathology 8/4/2022:  Right axillary node= metastatic adenocarcinoma, favor breast, ER 95% OR <1% Her 2 3+\par \par [facility]  Small area of architectural distortion middle depth 12:00 on sixto. Small grouping of coarse heterogeneous microcalcifications 12:00 anterior to middle depth associated with distortion. \par \par \par Bilateral breast ultrasound 8/11/2022:  Right 12 N6 irregular isoechoic heterogeneous mass with angular margins and associated grouped calcifications correlates with mammogram [id number] mm.  Asymmetrical cortical thickening 9mm, of [id number] mm axillary node.\par \par PET/CT 8/11/2022:  FDG avid upper right breast nodule suspicious for malignancy. 2 prominent right axillary nodes with asymmetrical cortical thickening or mild FDG uptake, suspicious for metastasis.  Right adrenal nodule 1.8 cm.  9 mm left upper pole thyroid nodule.\par \par Thyroid ultrasound 8/10/2022:  Right lower pole stable nodule 9x5x8 mm, left mid upper pole stable 10x6x9 mm nodule.  New lower pole 11x6x9 mm nodule- and 6x4x8 mm nodule follow-up in 6 months.\par \par Pathology 8/18/2022:  Right 12N6= invasive well- mod diff with partial micropapillary features, % OR 50% Her 2 2+ FISH amplified\par \par Bilateral breast MRI 8/16/2022:  Irregular masslike area of enhancement 23x20 mm central anterior 12:00.  Inferior aspect with 2.5 cm clumped enhancement extending toward nipple. Approximately 5x3 mm focal enhancement anterior to middle depth lateral lower breast, follow-up in 6 months.  Enlarged pathologic lymph node 31x20 mm with at least 2-3 nodes involved.  Nonspecific left axillary nodes.\par \par Bilateral breast MRI 12/27/2022:  Superior right subtle region of distortion and enhancement up to 1.6 cm markedly decreased from prior 3 cm; normal fat containing right axillary lymph node with artifact at prior site of known positive node.

## 2023-02-22 NOTE — HISTORY OF PRESENT ILLNESS
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  She then had some erythema of her right lower extremity and although it didn't seem like another episode of phlebitis, she was advised to see an Oncologist to evaluate for an underlying malignancy.  She saw Dr. Bernardino Ortiz and had a CT of her chest , abdomen and pelvis in July 2022.  She was found to have a right axillary node.  A biopsy of the node showed metastatic adenocarcinoma, possible breast primary.  She had a mammogram and breast ultrasound and a suspicious mass was found in the right breast.  A core biopsy confirmed the primary in the breast. PET/CT did  not show distant metastatic disease. A Saviscout was placed in the right axillary node.\par \par She began chemotherapy with TCHP with Dr. Ortiz, but changed Oncologists and to [place] [facility] [place]  She has gotten 5/6 planned cycles.  She had a very difficult time with severe diarrhea and the Perjeta was dropped with the last treatment.\par \par \par She underwent a magseed localized right breast lumpectomy with Oncoplastic approach and right sentinel node biopsy/excision of the originally positive node on 2/13/2022.  Pathology showed residual infiltrating ductal cancer in the breast dispersed over 1.7 cm but with abundant fibrosis and treatment effect.  Margins are negative.  The originally positive node had micrometastatic disease 1.2 mm, SLN #2 had isolated tumor cells and SLN#3 was negative.\par \par The drains were removed yesterday.\par She has an appointment to start KaPRyla tomorrow.\par \par I presented her case at Breast Tumor Board this morning and it was felt that radiation would be appropriate rather than a completion axillary dissection.  The patient also discussed this with friends who ar Radiation Oncologists and that was their opinion also.  She has not yet met with Radiation Therapy formally.

## 2023-02-22 NOTE — PHYSICAL EXAM
[de-identified] : Reduction pattern incisions healing well. [de-identified] : Reduction pattern incisions healing well. [de-identified] : Axillary incision healing well.

## 2023-03-13 ENCOUNTER — NON-APPOINTMENT (OUTPATIENT)
Age: 58
End: 2023-03-13

## 2023-03-22 ENCOUNTER — NON-APPOINTMENT (OUTPATIENT)
Age: 58
End: 2023-03-22

## 2023-05-10 ENCOUNTER — APPOINTMENT (OUTPATIENT)
Dept: INTERNAL MEDICINE | Facility: CLINIC | Age: 58
End: 2023-05-10
Payer: COMMERCIAL

## 2023-05-10 VITALS
OXYGEN SATURATION: 99 % | DIASTOLIC BLOOD PRESSURE: 80 MMHG | HEART RATE: 79 BPM | HEIGHT: 62 IN | TEMPERATURE: 97.5 F | BODY MASS INDEX: 28.71 KG/M2 | SYSTOLIC BLOOD PRESSURE: 122 MMHG | WEIGHT: 156 LBS

## 2023-05-10 DIAGNOSIS — R73.09 OTHER ABNORMAL GLUCOSE: ICD-10-CM

## 2023-05-10 DIAGNOSIS — Z71.89 OTHER SPECIFIED COUNSELING: ICD-10-CM

## 2023-05-10 DIAGNOSIS — A04.8 OTHER SPECIFIED BACTERIAL INTESTINAL INFECTIONS: ICD-10-CM

## 2023-05-10 PROCEDURE — 99396 PREV VISIT EST AGE 40-64: CPT

## 2023-05-11 PROBLEM — Z71.89 CPAP USE COUNSELING: Status: ACTIVE | Noted: 2019-12-23

## 2023-05-11 RX ORDER — TRIAMCINOLONE ACETONIDE 1 MG/G
0.1 OINTMENT TOPICAL
Qty: 454 | Refills: 0 | Status: ACTIVE | COMMUNITY
Start: 2023-03-13

## 2023-05-11 RX ORDER — DEXAMETHASONE 1 MG/1
1 TABLET ORAL
Qty: 1 | Refills: 0 | Status: ACTIVE | COMMUNITY
Start: 2023-05-05

## 2023-05-11 RX ORDER — LORAZEPAM 1 MG/1
1 TABLET ORAL
Qty: 40 | Refills: 0 | Status: ACTIVE | COMMUNITY
Start: 2023-02-27

## 2023-05-11 RX ORDER — SILVER SULFADIAZINE 10 G/1000G
1 CREAM TOPICAL
Qty: 400 | Refills: 0 | Status: ACTIVE | COMMUNITY
Start: 2023-05-08

## 2023-05-11 RX ORDER — LIDOCAINE AND PRILOCAINE 25; 25 MG/G; MG/G
2.5-2.5 CREAM TOPICAL
Qty: 30 | Refills: 0 | Status: ACTIVE | COMMUNITY
Start: 2023-03-15

## 2023-05-11 NOTE — HISTORY OF PRESENT ILLNESS
[de-identified] : Patient is a 58-year-old female with past medical history significant for breast cancer, hypertension, hyperlipidemia, obstructive sleep apnea, h/o C. difficile diarrhea, presents for annual wellness exam.\par Patient reports that she completed completed RT for breast cancer on Monday.\par Will continue chemotherapy every 3 weeks until the end of year.\par Being followed [facility] (Dr. Milan) and breast surgeon ( Dr. Oakes).\par Remains under the care of infectious disease for treatment of recurrent C. difficile.\par Continues to follow-up routinely with her endocrinologist for history of thyroid and adrenal nodules.\par Patient requests testing for H. pylori to confirm eradication of infection after treatment.\par Patient reports that blood testing done through her oncologist every 3 weeks.  LFTs were reportedly mildly elevated on last test.  She has subsequently discontinued Lipitor for the past week.  \par She was seen by her gynecologist yesterday and blood pressure was noted to be borderline elevated at 136/90.  \par Blood pressures at home have been well controlled.

## 2023-05-11 NOTE — PHYSICAL EXAM
[Normal Oropharynx] : the oropharynx was normal [Normal TMs] : both tympanic membranes were normal [No Carotid Bruits] : no carotid bruits [Pedal Pulses Present] : the pedal pulses are present [No Edema] : there was no peripheral edema [No Extremity Clubbing/Cyanosis] : no extremity clubbing/cyanosis [Normal] : soft, non-tender, non-distended, no masses palpated, no HSM and normal bowel sounds [Normal Posterior Cervical Nodes] : no posterior cervical lymphadenopathy [Normal Anterior Cervical Nodes] : no anterior cervical lymphadenopathy [No Joint Swelling] : no joint swelling [Grossly Normal Strength/Tone] : grossly normal strength/tone [No Focal Deficits] : no focal deficits [Normal Affect] : the affect was normal [Alert and Oriented x3] : oriented to person, place, and time [Normal Insight/Judgement] : insight and judgment were intact [de-identified] : Erythema involving entire right breast secondary to radiation.

## 2023-05-11 NOTE — HEALTH RISK ASSESSMENT
[Good] : ~his/her~  mood as  good [No] : No [No falls in past year] : Patient reported no falls in the past year [0] : 2) Feeling down, depressed, or hopeless: Not at all (0) [With Significant Other] : lives with significant other [# of Members in Household ___] :  household currently consist of [unfilled] member(s) [Employed] : employed [Graduate School] : graduate school [] :  [Feels Safe at Home] : Feels safe at home [Fully functional (bathing, dressing, toileting, transferring, walking, feeding)] : Fully functional (bathing, dressing, toileting, transferring, walking, feeding) [Fully functional (using the telephone, shopping, preparing meals, housekeeping, doing laundry, using] : Fully functional and needs no help or supervision to perform IADLs (using the telephone, shopping, preparing meals, housekeeping, doing laundry, using transportation, managing medications and managing finances) [Smoke Detector] : smoke detector [Carbon Monoxide Detector] : carbon monoxide detector [Seat Belt] :  uses seat belt [Sunscreen] : uses sunscreen [Never] : Never [de-identified] : Has not been exercising regularly [[id number]] : 0 [Change in mental status noted] : No change in mental status noted [Language] : denies difficulty with language [Behavior] : denies difficulty with behavior [Handling Complex Tasks] : denies difficulty handling complex tasks [Reasoning] : denies difficulty with reasoning [Reports changes in hearing] : Reports no changes in hearing [Reports changes in vision] : Reports no changes in vision [Reports changes in dental health] : Reports no changes in dental health [PapSmearDate] : 5/9/2023 [BoneDensityDate] : 2020 [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

## 2023-05-14 DIAGNOSIS — R80.9 PROTEINURIA, UNSPECIFIED: ICD-10-CM

## 2023-05-17 ENCOUNTER — NON-APPOINTMENT (OUTPATIENT)
Age: 58
End: 2023-05-17

## 2023-05-17 PROBLEM — R80.9 PROTEIN IN URINE: Status: ACTIVE | Noted: 2023-05-17

## 2023-05-24 ENCOUNTER — APPOINTMENT (OUTPATIENT)
Dept: BREAST CENTER | Facility: CLINIC | Age: 58
End: 2023-05-24
Payer: COMMERCIAL

## 2023-05-24 VITALS
HEIGHT: 62 IN | HEART RATE: 82 BPM | BODY MASS INDEX: 28.34 KG/M2 | WEIGHT: 154 LBS | DIASTOLIC BLOOD PRESSURE: 76 MMHG | SYSTOLIC BLOOD PRESSURE: 114 MMHG

## 2023-05-24 PROCEDURE — 99213 OFFICE O/P EST LOW 20 MIN: CPT

## 2023-05-24 NOTE — CONSULT LETTER
Problem: Pain:  Goal: Control of acute pain  Control of acute pain   Outcome: Ongoing [Dear  ___] : Dear  [unfilled], [Consult Letter:] : I had the pleasure of evaluating your patient, [unfilled]. [Sincerely,] : Sincerely, [DrSumaya  ___] : Dr. VALVERDE [DrSumaya ___] : Dr. VALVERDE

## 2023-05-24 NOTE — DATA REVIEWED
[FreeTextEntry1] : Bilateral mammogram 12/23/2021:  No evidence of malignancy.\par \par Bilateral ultrasound ([facility]) 6/22/2022:  No evidence of malignancy.\par \par CT abd/pelvis 7/20/2022:  Right adrenal nodule 18x16 mm (similar to 2/2020).  Several mesenteric lymph nodes borderline size.\par \par CT chest:  7/20/2022:  Right axillary node 36x14 mm. Right adrenal nodule.\par \par Pathology 8/4/2022:  Right axillary node= metastatic adenocarcinoma, favor breast, ER 95% NV <1% Her 2 3+\par \par [facility]  Small area of architectural distortion middle depth 12:00 on sixto. Small grouping of coarse heterogeneous microcalcifications 12:00 anterior to middle depth associated with distortion. \par \par \par Bilateral breast ultrasound 8/11/2022:  Right 12 N6 irregular isoechoic heterogeneous mass with angular margins and associated grouped calcifications correlates with mammogram [id number] mm.  Asymmetrical cortical thickening 9mm, of 17i02u38 mm axillary node.\par \par PET/CT 8/11/2022:  FDG avid upper right breast nodule suspicious for malignancy. 2 prominent right axillary nodes with asymmetrical cortical thickening or mild FDG uptake, suspicious for metastasis.  Right adrenal nodule 1.8 cm.  9 mm left upper pole thyroid nodule.\par \par Thyroid ultrasound 8/10/2022:  Right lower pole stable nodule 9x5x8 mm, left mid upper pole stable 10x6x9 mm nodule.  New lower pole 11x6x9 mm nodule- and 6x4x8 mm nodule follow-up in 6 months.\par \par Pathology 8/18/2022:  Right 12N6= invasive well- mod diff with partial micropapillary features, % NV 50% Her 2 2+ FISH amplified\par \par Bilateral breast MRI 8/16/2022:  Irregular masslike area of enhancement 23x20 mm central anterior 12:00.  Inferior aspect with 2.5 cm clumped enhancement extending toward nipple. Approximately 5x3 mm focal enhancement anterior to middle depth lateral lower breast, follow-up in 6 months.  Enlarged pathologic lymph node 31x20 mm with at least 2-3 nodes involved.  Nonspecific left axillary nodes.\par \par Bilateral breast MRI 12/27/2022:  Superior right subtle region of distortion and enhancement up to 1.6 cm markedly decreased from prior 3 cm; normal fat containing right axillary lymph node with artifact at prior site of known positive node.

## 2023-05-24 NOTE — HISTORY OF PRESENT ILLNESS
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  She then had some erythema of her right lower extremity and although it didn't seem like another episode of phlebitis, she was advised to see an Oncologist to evaluate for an underlying malignancy.  She saw Dr. Bernardino Ortiz and had a CT of her chest , abdomen and pelvis in July 2022.  She was found to have a right axillary node.  A biopsy of the node showed metastatic adenocarcinoma, possible breast primary.  She had a mammogram and breast ultrasound and a suspicious mass was found in the right breast.  A core biopsy confirmed the primary in the breast. PET/CT did  not show distant metastatic disease. A Saviscout was placed in the right axillary node.\par \par She began chemotherapy with TCHP with Dr. Ortiz, but [place] Oncologists and [place] [facility] [place]  She has gotten 5/6 planned cycles.  She had a very difficult time with severe diarrhea and the Perjeta was dropped with the last treatment.\par \par \par She underwent a magseed localized right breast lumpectomy with Oncoplastic approach and right sentinel node biopsy/excision of the originally positive node on 2/13/2022.  Pathology showed residual infiltrating ductal cancer in the breast dispersed over 1.7 cm but with abundant fibrosis and treatment effect.  Margins are negative.  The originally positive node had micrometastatic disease 1.2 mm, SLN #2 had isolated tumor cells and SLN#3 was negative.\par \par [place] [facility] [place] and [place] on Kadcyla.\par \par

## 2023-05-24 NOTE — PHYSICAL EXAM
[EOMI] : extra ocular movement intact [Sclera nonicteric] : sclera nonicteric [Supple] : supple [No Supraclavicular Adenopathy] : no supraclavicular adenopathy [No Cervical Adenopathy] : no cervical adenopathy [No Thyromegaly] : no thyromegaly [Clear to Auscultation Bilat] : clear to auscultation bilaterally [Examined in the supine and seated position] : examined in the supine and seated position [No dominant masses] : no dominant masses in right breast  [No dominant masses] : no dominant masses left breast [No Nipple Retraction] : no left nipple retraction [No Nipple Discharge] : no left nipple discharge [No Axillary Lymphadenopathy] : no left axillary lymphadenopathy [de-identified] : Reduction pattern scars.  Dry desquamation UOQ and lower inner.  Moderate hyperpigmentation.  Dense breast tissue. [de-identified] : Reduction pattern scars.  Dense breast tissue. [de-identified] : Axillary scar.

## 2023-05-30 ENCOUNTER — TRANSCRIPTION ENCOUNTER (OUTPATIENT)
Age: 58
End: 2023-05-30

## 2023-08-02 ENCOUNTER — RX RENEWAL (OUTPATIENT)
Age: 58
End: 2023-08-02

## 2023-08-03 ENCOUNTER — TRANSCRIPTION ENCOUNTER (OUTPATIENT)
Age: 58
End: 2023-08-03

## 2023-08-08 ENCOUNTER — EMERGENCY (EMERGENCY)
Facility: HOSPITAL | Age: 58
LOS: 1 days | Discharge: HOME | End: 2023-08-08
Attending: STUDENT IN AN ORGANIZED HEALTH CARE EDUCATION/TRAINING PROGRAM
Payer: COMMERCIAL

## 2023-08-08 VITALS
OXYGEN SATURATION: 95 % | SYSTOLIC BLOOD PRESSURE: 124 MMHG | HEART RATE: 109 BPM | DIASTOLIC BLOOD PRESSURE: 74 MMHG | RESPIRATION RATE: 18 BRPM

## 2023-08-08 DIAGNOSIS — Z98.890 OTHER SPECIFIED POSTPROCEDURAL STATES: Chronic | ICD-10-CM

## 2023-08-08 DIAGNOSIS — Z90.89 ACQUIRED ABSENCE OF OTHER ORGANS: Chronic | ICD-10-CM

## 2023-08-08 PROCEDURE — 99285 EMERGENCY DEPT VISIT HI MDM: CPT

## 2023-08-08 NOTE — ED CLERICAL - NS ED CLERK NOTE PRE-ARRIVAL INFORMATION; ADDITIONAL PRE-ARRIVAL INFORMATION
CC/Reason For referral: COVID R/O Neutropenic  Preferred Consultant(if applicable):n/a  Who admits for you (if needed): [place] cancer blood specialist team   Do you have documents you would like to fax over? no  Would you still like to speak to an ED attending? yes

## 2023-08-09 VITALS
HEART RATE: 86 BPM | TEMPERATURE: 98 F | SYSTOLIC BLOOD PRESSURE: 108 MMHG | RESPIRATION RATE: 18 BRPM | DIASTOLIC BLOOD PRESSURE: 66 MMHG | OXYGEN SATURATION: 96 %

## 2023-08-09 LAB
ALBUMIN SERPL ELPH-MCNC: 4.5 G/DL — SIGNIFICANT CHANGE UP (ref 3.3–5)
ALP SERPL-CCNC: 190 U/L — HIGH (ref 40–120)
ALT FLD-CCNC: 41 U/L — SIGNIFICANT CHANGE UP (ref 10–45)
ANION GAP SERPL CALC-SCNC: 14 MMOL/L — SIGNIFICANT CHANGE UP (ref 5–17)
APPEARANCE UR: CLEAR — SIGNIFICANT CHANGE UP
APTT BLD: 30.2 SEC — SIGNIFICANT CHANGE UP (ref 24.5–35.6)
AST SERPL-CCNC: 40 U/L — SIGNIFICANT CHANGE UP (ref 10–40)
BASOPHILS # BLD AUTO: 0.04 K/UL — SIGNIFICANT CHANGE UP (ref 0–0.2)
BASOPHILS NFR BLD AUTO: 0.7 % — SIGNIFICANT CHANGE UP (ref 0–2)
BILIRUB SERPL-MCNC: 0.6 MG/DL — SIGNIFICANT CHANGE UP (ref 0.2–1.2)
BILIRUB UR-MCNC: NEGATIVE — SIGNIFICANT CHANGE UP
BUN SERPL-MCNC: 13 MG/DL — SIGNIFICANT CHANGE UP (ref 7–23)
CALCIUM SERPL-MCNC: 10 MG/DL — SIGNIFICANT CHANGE UP (ref 8.4–10.5)
CHLORIDE SERPL-SCNC: 98 MMOL/L — SIGNIFICANT CHANGE UP (ref 96–108)
CO2 SERPL-SCNC: 24 MMOL/L — SIGNIFICANT CHANGE UP (ref 22–31)
COLOR SPEC: COLORLESS — SIGNIFICANT CHANGE UP
CREAT SERPL-MCNC: 0.73 MG/DL — SIGNIFICANT CHANGE UP (ref 0.5–1.3)
DIFF PNL FLD: NEGATIVE — SIGNIFICANT CHANGE UP
EGFR: 94 ML/MIN/1.73M2 — SIGNIFICANT CHANGE UP
EOSINOPHIL # BLD AUTO: 0.17 K/UL — SIGNIFICANT CHANGE UP (ref 0–0.5)
EOSINOPHIL NFR BLD AUTO: 2.9 % — SIGNIFICANT CHANGE UP (ref 0–6)
FLUAV AG NPH QL: SIGNIFICANT CHANGE UP
FLUBV AG NPH QL: SIGNIFICANT CHANGE UP
GLUCOSE SERPL-MCNC: 91 MG/DL — SIGNIFICANT CHANGE UP (ref 70–99)
GLUCOSE UR QL: NEGATIVE — SIGNIFICANT CHANGE UP
HCT VFR BLD CALC: 37.3 % — SIGNIFICANT CHANGE UP (ref 34.5–45)
HGB BLD-MCNC: 11.7 G/DL — SIGNIFICANT CHANGE UP (ref 11.5–15.5)
IMM GRANULOCYTES NFR BLD AUTO: 1.2 % — HIGH (ref 0–0.9)
INR BLD: 1.01 RATIO — SIGNIFICANT CHANGE UP (ref 0.85–1.18)
KETONES UR-MCNC: NEGATIVE — SIGNIFICANT CHANGE UP
LEUKOCYTE ESTERASE UR-ACNC: NEGATIVE — SIGNIFICANT CHANGE UP
LYMPHOCYTES # BLD AUTO: 1.35 K/UL — SIGNIFICANT CHANGE UP (ref 1–3.3)
LYMPHOCYTES # BLD AUTO: 23 % — SIGNIFICANT CHANGE UP (ref 13–44)
MCHC RBC-ENTMCNC: 24.6 PG — LOW (ref 27–34)
MCHC RBC-ENTMCNC: 31.4 GM/DL — LOW (ref 32–36)
MCV RBC AUTO: 78.4 FL — LOW (ref 80–100)
MONOCYTES # BLD AUTO: 0.83 K/UL — SIGNIFICANT CHANGE UP (ref 0–0.9)
MONOCYTES NFR BLD AUTO: 14.2 % — HIGH (ref 2–14)
NEUTROPHILS # BLD AUTO: 3.4 K/UL — SIGNIFICANT CHANGE UP (ref 1.8–7.4)
NEUTROPHILS NFR BLD AUTO: 58 % — SIGNIFICANT CHANGE UP (ref 43–77)
NITRITE UR-MCNC: NEGATIVE — SIGNIFICANT CHANGE UP
NRBC # BLD: 0 /100 WBCS — SIGNIFICANT CHANGE UP (ref 0–0)
PH UR: 5 — SIGNIFICANT CHANGE UP (ref 5–8)
PLATELET # BLD AUTO: 142 K/UL — LOW (ref 150–400)
POTASSIUM SERPL-MCNC: 3.8 MMOL/L — SIGNIFICANT CHANGE UP (ref 3.5–5.3)
POTASSIUM SERPL-SCNC: 3.8 MMOL/L — SIGNIFICANT CHANGE UP (ref 3.5–5.3)
PROT SERPL-MCNC: 7.6 G/DL — SIGNIFICANT CHANGE UP (ref 6–8.3)
PROT UR-MCNC: NEGATIVE — SIGNIFICANT CHANGE UP
PROTHROM AB SERPL-ACNC: 10.6 SEC — SIGNIFICANT CHANGE UP (ref 9.5–13)
RBC # BLD: 4.76 M/UL — SIGNIFICANT CHANGE UP (ref 3.8–5.2)
RBC # FLD: 15.9 % — HIGH (ref 10.3–14.5)
RSV RNA NPH QL NAA+NON-PROBE: SIGNIFICANT CHANGE UP
SARS-COV-2 RNA SPEC QL NAA+PROBE: DETECTED
SODIUM SERPL-SCNC: 136 MMOL/L — SIGNIFICANT CHANGE UP (ref 135–145)
SP GR SPEC: 1 — LOW (ref 1.01–1.02)
UROBILINOGEN FLD QL: NEGATIVE — SIGNIFICANT CHANGE UP
WBC # BLD: 5.86 K/UL — SIGNIFICANT CHANGE UP (ref 3.8–10.5)
WBC # FLD AUTO: 5.86 K/UL — SIGNIFICANT CHANGE UP (ref 3.8–10.5)

## 2023-08-09 PROCEDURE — 85730 THROMBOPLASTIN TIME PARTIAL: CPT

## 2023-08-09 PROCEDURE — 83605 ASSAY OF LACTIC ACID: CPT

## 2023-08-09 PROCEDURE — 82330 ASSAY OF CALCIUM: CPT

## 2023-08-09 PROCEDURE — 81003 URINALYSIS AUTO W/O SCOPE: CPT

## 2023-08-09 PROCEDURE — 82947 ASSAY GLUCOSE BLOOD QUANT: CPT

## 2023-08-09 PROCEDURE — 82435 ASSAY OF BLOOD CHLORIDE: CPT

## 2023-08-09 PROCEDURE — 85018 HEMOGLOBIN: CPT

## 2023-08-09 PROCEDURE — 87040 BLOOD CULTURE FOR BACTERIA: CPT

## 2023-08-09 PROCEDURE — 80053 COMPREHEN METABOLIC PANEL: CPT

## 2023-08-09 PROCEDURE — 99285 EMERGENCY DEPT VISIT HI MDM: CPT | Mod: 25

## 2023-08-09 PROCEDURE — 87637 SARSCOV2&INF A&B&RSV AMP PRB: CPT

## 2023-08-09 PROCEDURE — 85610 PROTHROMBIN TIME: CPT

## 2023-08-09 PROCEDURE — 82803 BLOOD GASES ANY COMBINATION: CPT

## 2023-08-09 PROCEDURE — 85014 HEMATOCRIT: CPT

## 2023-08-09 PROCEDURE — 71045 X-RAY EXAM CHEST 1 VIEW: CPT

## 2023-08-09 PROCEDURE — 85025 COMPLETE CBC W/AUTO DIFF WBC: CPT

## 2023-08-09 PROCEDURE — 84295 ASSAY OF SERUM SODIUM: CPT

## 2023-08-09 PROCEDURE — 93005 ELECTROCARDIOGRAM TRACING: CPT

## 2023-08-09 PROCEDURE — 84132 ASSAY OF SERUM POTASSIUM: CPT

## 2023-08-09 PROCEDURE — 87086 URINE CULTURE/COLONY COUNT: CPT

## 2023-08-09 PROCEDURE — 36415 COLL VENOUS BLD VENIPUNCTURE: CPT

## 2023-08-09 PROCEDURE — 71045 X-RAY EXAM CHEST 1 VIEW: CPT | Mod: 26

## 2023-08-09 RX ORDER — SODIUM CHLORIDE 9 MG/ML
1000 INJECTION INTRAMUSCULAR; INTRAVENOUS; SUBCUTANEOUS ONCE
Refills: 0 | Status: COMPLETED | OUTPATIENT
Start: 2023-08-09 | End: 2023-08-09

## 2023-08-09 RX ADMIN — SODIUM CHLORIDE 1000 MILLILITER(S): 9 INJECTION INTRAMUSCULAR; INTRAVENOUS; SUBCUTANEOUS at 01:12

## 2023-08-09 NOTE — ED ADULT NURSE NOTE - NSFALLUNIVINTERV_ED_ALL_ED
Bed/Stretcher in lowest position, wheels locked, appropriate side rails in place/Call bell, personal items and telephone in reach/Instruct patient to call for assistance before getting out of bed/chair/stretcher/Non-slip footwear applied when patient is off stretcher/[place] to call system/Physically safe environment - no spills, clutter or unnecessary equipment/Purposeful proactive rounding/Room/bathroom lighting operational, light cord in reach

## 2023-08-09 NOTE — ED PROVIDER NOTE - ATTENDING CONTRIBUTION TO CARE
59 yo F PMHx of HTN, HLD, breast CA, on chemo (last 14 days ago), presenting to ED for fevers to 101 at home. Associated congestion and cough, and general malaise/body aches. COVID positive on home test. Spoke w/ oncology at [facility] and recommended eval in ED for neutropenic fever. Pt reports hx of neutropenia w/ ANC ranging from 500-1000 on lab work per pt. No N/V/D, sore throat, HA, or changes in urination. No abd pain. Exam largely unremarkable. VS, pt not acutely ill appearing. No rashes. CTAB. Abd soft, non ttp. No meningismus. Concern for neutropenic fever, viral illness, covid, occult bacterial infection. No resp distress, denies SOB or CP. Will obtain sepsis screening labs given pt on chemo. HDS at time of initial eval, declines tylenol. Will reassess and discuss w/ pt's oncology team following work up.

## 2023-08-09 NOTE — ED PROVIDER NOTE - PHYSICAL EXAMINATION
Gen: Patient is well-appearing, NAD, AAOx3, able to ambulate without assistance  HEENT: NCAT, normal conjunctiva, tongue midline, oral mucosa moist  Lung: CTAB, no respiratory distress, no wheezes/rhonchi/rales B/L, speaking in full sentences  CV: tachycardic, no murmurs, rubs or gallops, distal pulses 2+ b/l  Abd: soft, NT, ND, no guarding, no rigidity, no rebound tenderness  MSK: no visible deformities, ROM normal in UE/LE  Neuro: No focal sensory or motor deficits  Skin: Warm, well perfused, no leg swelling  Psych: normal affect, calm

## 2023-08-09 NOTE — ED PROVIDER NOTE - PATIENT PORTAL LINK FT
You can access the FollowMyHealth Patient Portal offered by [facility] by registering at the following website: [email or web address]. By joining [employer]’s FollowMyHealth portal, you will also be able to view your health information using other applications (apps) compatible with our system.

## 2023-08-09 NOTE — ED PROVIDER NOTE - PROGRESS NOTE DETAILS
Andie PGY3: WBC normal. MSK paged. Awaiting return call. Shared decision made with patient who would like to leave. Patient reports that she will communicate with her oncologist in the morning regarding the initiation of paxlovid. Patient will be discharged.

## 2023-08-09 NOTE — ED PROVIDER NOTE - NSFOLLOWUPINSTRUCTIONS_ED_ALL_ED_FT
You came to the Emergency Room because of covid infection and you were worried about  your neutrophil count.     Your WBC count is normal.    Please follow up with your Oncologist provider in the clinic tomorrow for possible initiation of Paxlovid.     You may take Tylenol 1000 mg every 8 hours (no more than 4000 mg per 24 hours) as needed for fever.    Please come back to the Emergency Room if your symptoms get worse or if you start developing shortness of breath, chest pain.

## 2023-08-09 NOTE — ED PROVIDER NOTE - OBJECTIVE STATEMENT
58 years old female, history of breast cancer on chemotherapy (last treatment 12 days ago), hypertension, hyperlipidemia, presenting with 2 days onset of fever, cough.  Patient's was tested positive for COVID today.  She came to the emergency room for neutrophil count check.  Denies chest pain, shortness of breath, abdominal pain.  Patient's follow oncologist from [facility] [place] ([phone]).  PMD: Dr. Goodman.

## 2023-08-09 NOTE — ED ADULT NURSE NOTE - OBJECTIVE STATEMENT
59y/o F coming to the ED c/o fever, cough, COVID+. Pt states that she started having cold-like symptoms yesterday and tested positive today. Pt denies any CP/SOB/N/V/D. Pt states called CHERYLE was told to come to the ED d/t neutropenia. On exam, pt is breathing spontaneously, able to speak full sentences w.o difficulty, saturating 97% RA. Abdomen soft, nontender, nondistended. VSS.

## 2023-08-10 LAB
CULTURE RESULTS: SIGNIFICANT CHANGE UP
SPECIMEN SOURCE: SIGNIFICANT CHANGE UP

## 2023-08-14 LAB
CULTURE RESULTS: SIGNIFICANT CHANGE UP
SPECIMEN SOURCE: SIGNIFICANT CHANGE UP

## 2023-08-30 ENCOUNTER — OUTPATIENT (OUTPATIENT)
Dept: OUTPATIENT SERVICES | Facility: HOSPITAL | Age: 58
LOS: 1 days | End: 2023-08-30
Payer: COMMERCIAL

## 2023-08-30 ENCOUNTER — RESULT REVIEW (OUTPATIENT)
Age: 58
End: 2023-08-30

## 2023-08-30 ENCOUNTER — APPOINTMENT (OUTPATIENT)
Dept: ULTRASOUND IMAGING | Facility: CLINIC | Age: 58
End: 2023-08-30
Payer: COMMERCIAL

## 2023-08-30 ENCOUNTER — APPOINTMENT (OUTPATIENT)
Dept: MAMMOGRAPHY | Facility: CLINIC | Age: 58
End: 2023-08-30
Payer: COMMERCIAL

## 2023-08-30 DIAGNOSIS — Z98.890 OTHER SPECIFIED POSTPROCEDURAL STATES: Chronic | ICD-10-CM

## 2023-08-30 DIAGNOSIS — C50.911 MALIGNANT NEOPLASM OF UNSPECIFIED SITE OF RIGHT FEMALE BREAST: ICD-10-CM

## 2023-08-30 DIAGNOSIS — Z90.89 ACQUIRED ABSENCE OF OTHER ORGANS: Chronic | ICD-10-CM

## 2023-08-30 DIAGNOSIS — Z00.8 ENCOUNTER FOR OTHER GENERAL EXAMINATION: ICD-10-CM

## 2023-08-30 PROCEDURE — G0279: CPT | Mod: 26

## 2023-08-30 PROCEDURE — 77066 DX MAMMO INCL CAD BI: CPT | Mod: 26

## 2023-08-30 PROCEDURE — 77066 DX MAMMO INCL CAD BI: CPT

## 2023-08-30 PROCEDURE — 76641 ULTRASOUND BREAST COMPLETE: CPT

## 2023-08-30 PROCEDURE — 76641 ULTRASOUND BREAST COMPLETE: CPT | Mod: 26,50

## 2023-08-30 PROCEDURE — G0279: CPT

## 2023-10-05 ENCOUNTER — APPOINTMENT (OUTPATIENT)
Dept: BREAST CENTER | Facility: CLINIC | Age: 58
End: 2023-10-05
Payer: COMMERCIAL

## 2023-10-05 VITALS
BODY MASS INDEX: 29.44 KG/M2 | HEART RATE: 87 BPM | DIASTOLIC BLOOD PRESSURE: 76 MMHG | WEIGHT: 160 LBS | HEIGHT: 62 IN | SYSTOLIC BLOOD PRESSURE: 119 MMHG

## 2023-10-05 DIAGNOSIS — N61.0 MASTITIS WITHOUT ABSCESS: ICD-10-CM

## 2023-10-05 PROCEDURE — 76642 ULTRASOUND BREAST LIMITED: CPT

## 2023-10-05 PROCEDURE — 99213 OFFICE O/P EST LOW 20 MIN: CPT

## 2023-10-05 RX ORDER — SULFAMETHOXAZOLE AND TRIMETHOPRIM 400; 80 MG/1; MG/1
400-80 TABLET ORAL TWICE DAILY
Qty: 14 | Refills: 0 | Status: ACTIVE | COMMUNITY
Start: 2023-10-05 | End: 1900-01-01

## 2023-11-29 ENCOUNTER — APPOINTMENT (OUTPATIENT)
Dept: BREAST CENTER | Facility: CLINIC | Age: 58
End: 2023-11-29
Payer: COMMERCIAL

## 2023-11-29 VITALS
HEIGHT: 62 IN | WEIGHT: 162 LBS | HEART RATE: 90 BPM | DIASTOLIC BLOOD PRESSURE: 84 MMHG | SYSTOLIC BLOOD PRESSURE: 125 MMHG | BODY MASS INDEX: 29.81 KG/M2

## 2023-11-29 DIAGNOSIS — N64.4 MASTODYNIA: ICD-10-CM

## 2023-11-29 PROCEDURE — 99213 OFFICE O/P EST LOW 20 MIN: CPT

## 2023-12-20 ENCOUNTER — OUTPATIENT (OUTPATIENT)
Dept: OUTPATIENT SERVICES | Facility: HOSPITAL | Age: 58
LOS: 1 days | End: 2023-12-20
Payer: COMMERCIAL

## 2023-12-20 ENCOUNTER — APPOINTMENT (OUTPATIENT)
Dept: MRI IMAGING | Facility: CLINIC | Age: 58
End: 2023-12-20
Payer: COMMERCIAL

## 2023-12-20 DIAGNOSIS — Z90.89 ACQUIRED ABSENCE OF OTHER ORGANS: Chronic | ICD-10-CM

## 2023-12-20 DIAGNOSIS — Z98.890 OTHER SPECIFIED POSTPROCEDURAL STATES: Chronic | ICD-10-CM

## 2023-12-20 DIAGNOSIS — C50.911 MALIGNANT NEOPLASM OF UNSPECIFIED SITE OF RIGHT FEMALE BREAST: ICD-10-CM

## 2023-12-20 DIAGNOSIS — Z00.8 ENCOUNTER FOR OTHER GENERAL EXAMINATION: ICD-10-CM

## 2023-12-20 PROCEDURE — 77049 MRI BREAST C-+ W/CAD BI: CPT | Mod: 26

## 2023-12-20 PROCEDURE — A9585: CPT

## 2023-12-20 PROCEDURE — C8908: CPT

## 2023-12-20 PROCEDURE — C8937: CPT

## 2024-04-25 ENCOUNTER — APPOINTMENT (OUTPATIENT)
Dept: GASTROENTEROLOGY | Facility: CLINIC | Age: 59
End: 2024-04-25

## 2024-05-01 ENCOUNTER — TRANSCRIPTION ENCOUNTER (OUTPATIENT)
Age: 59
End: 2024-05-01

## 2024-05-02 ENCOUNTER — TRANSCRIPTION ENCOUNTER (OUTPATIENT)
Age: 59
End: 2024-05-02

## 2024-05-03 ENCOUNTER — RX RENEWAL (OUTPATIENT)
Age: 59
End: 2024-05-03

## 2024-05-06 ENCOUNTER — RX RENEWAL (OUTPATIENT)
Age: 59
End: 2024-05-06

## 2024-05-15 ENCOUNTER — APPOINTMENT (OUTPATIENT)
Dept: INTERNAL MEDICINE | Facility: CLINIC | Age: 59
End: 2024-05-15
Payer: COMMERCIAL

## 2024-05-15 ENCOUNTER — NON-APPOINTMENT (OUTPATIENT)
Age: 59
End: 2024-05-15

## 2024-05-15 VITALS
OXYGEN SATURATION: 98 % | HEART RATE: 85 BPM | HEIGHT: 62 IN | TEMPERATURE: 98.3 F | BODY MASS INDEX: 30 KG/M2 | RESPIRATION RATE: 17 BRPM | WEIGHT: 163 LBS

## 2024-05-15 VITALS — SYSTOLIC BLOOD PRESSURE: 126 MMHG | DIASTOLIC BLOOD PRESSURE: 80 MMHG

## 2024-05-15 VITALS
BODY MASS INDEX: 30 KG/M2 | RESPIRATION RATE: 17 BRPM | TEMPERATURE: 98.3 F | HEIGHT: 62 IN | SYSTOLIC BLOOD PRESSURE: 132 MMHG | DIASTOLIC BLOOD PRESSURE: 85 MMHG | WEIGHT: 163 LBS | HEART RATE: 85 BPM | OXYGEN SATURATION: 98 %

## 2024-05-15 DIAGNOSIS — A49.8 OTHER BACTERIAL INFECTIONS OF UNSPECIFIED SITE: ICD-10-CM

## 2024-05-15 DIAGNOSIS — J45.991 COUGH VARIANT ASTHMA: ICD-10-CM

## 2024-05-15 DIAGNOSIS — E78.5 HYPERLIPIDEMIA, UNSPECIFIED: ICD-10-CM

## 2024-05-15 DIAGNOSIS — A04.72 ENTEROCOLITIS DUE TO CLOSTRIDIUM DIFFICILE, NOT SPECIFIED AS RECURRENT: ICD-10-CM

## 2024-05-15 DIAGNOSIS — C50.911 MALIGNANT NEOPLASM OF UNSPECIFIED SITE OF RIGHT FEMALE BREAST: ICD-10-CM

## 2024-05-15 DIAGNOSIS — Z01.818 ENCOUNTER FOR OTHER PREPROCEDURAL EXAMINATION: ICD-10-CM

## 2024-05-15 DIAGNOSIS — C77.3 MALIGNANT NEOPLASM OF UNSPECIFIED SITE OF RIGHT FEMALE BREAST: ICD-10-CM

## 2024-05-15 DIAGNOSIS — I10 ESSENTIAL (PRIMARY) HYPERTENSION: ICD-10-CM

## 2024-05-15 DIAGNOSIS — R73.03 PREDIABETES.: ICD-10-CM

## 2024-05-15 DIAGNOSIS — E04.1 NONTOXIC SINGLE THYROID NODULE: ICD-10-CM

## 2024-05-15 PROCEDURE — 99214 OFFICE O/P EST MOD 30 MIN: CPT | Mod: 25

## 2024-05-15 PROCEDURE — 99396 PREV VISIT EST AGE 40-64: CPT

## 2024-05-15 RX ORDER — ANASTROZOLE TABLETS 1 MG/1
1 TABLET ORAL DAILY
Refills: 0 | Status: ACTIVE | COMMUNITY
Start: 2024-05-15

## 2024-05-15 RX ORDER — MONTELUKAST 10 MG/1
10 TABLET, FILM COATED ORAL
Qty: 1 | Refills: 2 | Status: ACTIVE | COMMUNITY
Start: 2019-10-28 | End: 1900-01-01

## 2024-05-15 RX ORDER — AMLODIPINE BESYLATE 2.5 MG/1
2.5 TABLET ORAL
Qty: 90 | Refills: 2 | Status: ACTIVE | COMMUNITY
Start: 2023-02-08 | End: 1900-01-01

## 2024-05-15 RX ORDER — VANCOMYCIN HYDROCHLORIDE 125 MG/1
125 CAPSULE ORAL
Qty: 60 | Refills: 2 | Status: ACTIVE | COMMUNITY
Start: 2022-12-01 | End: 1900-01-01

## 2024-05-15 RX ORDER — ATORVASTATIN CALCIUM 20 MG/1
20 TABLET, FILM COATED ORAL
Qty: 90 | Refills: 2 | Status: ACTIVE | COMMUNITY
Start: 2019-02-26 | End: 1900-01-01

## 2024-05-15 NOTE — HEALTH RISK ASSESSMENT
[Good] : ~his/her~  mood as  good [No] : No [No falls in past year] : Patient reported no falls in the past year [0] : 2) Feeling down, depressed, or hopeless: Not at all (0) [Patient reported PAP Smear was normal] : Patient reported PAP Smear was normal [Patient reported bone density results were normal] : Patient reported bone density results were normal [With Significant Other] : lives with significant other [Employed] : employed [Graduate School] : graduate school [] :  [# Of Children ___] : has [unfilled] children [Feels Safe at Home] : Feels safe at home [Fully functional (bathing, dressing, toileting, transferring, walking, feeding)] : Fully functional (bathing, dressing, toileting, transferring, walking, feeding) [Fully functional (using the telephone, shopping, preparing meals, housekeeping, doing laundry, using] : Fully functional and needs no help or supervision to perform IADLs (using the telephone, shopping, preparing meals, housekeeping, doing laundry, using transportation, managing medications and managing finances) [Smoke Detector] : smoke detector [Carbon Monoxide Detector] : carbon monoxide detector [Seat Belt] :  uses seat belt [Designated Healthcare Proxy] : Designated healthcare proxy [Name: ___] : Health Care Proxy's Name: [unfilled]  [Relationship: ___] : Relationship: [unfilled] [Never] : Never [Patient reported mammogram was normal] : Patient reported mammogram was normal

## 2024-05-15 NOTE — PHYSICAL EXAM
[No Acute Distress] : no acute distress [Well Nourished] : well nourished [Well Developed] : well developed [Well-Appearing] : well-appearing [Normal Sclera/Conjunctiva] : normal sclera/conjunctiva [PERRL] : pupils equal round and reactive to light [EOMI] : extraocular movements intact [Normal Outer Ear/Nose] : the outer ears and nose were normal in appearance [Normal Oropharynx] : the oropharynx was normal [No JVD] : no jugular venous distention [No Lymphadenopathy] : no lymphadenopathy [Supple] : supple [Thyroid Normal, No Nodules] : the thyroid was normal and there were no nodules present [No Respiratory Distress] : no respiratory distress  [No Accessory Muscle Use] : no accessory muscle use [Clear to Auscultation] : lungs were clear to auscultation bilaterally [Normal Rate] : normal rate  [Regular Rhythm] : with a regular rhythm [Normal S1, S2] : normal S1 and S2 [No Murmur] : no murmur heard [No Carotid Bruits] : no carotid bruits [No Abdominal Bruit] : a ~M bruit was not heard ~T in the abdomen [No Varicosities] : no varicosities [Pedal Pulses Present] : the pedal pulses are present [No Edema] : there was no peripheral edema [No Palpable Aorta] : no palpable aorta [No Extremity Clubbing/Cyanosis] : no extremity clubbing/cyanosis [Soft] : abdomen soft [Non Tender] : non-tender [Non-distended] : non-distended [No Masses] : no abdominal mass palpated [No HSM] : no HSM [Normal Bowel Sounds] : normal bowel sounds [Normal Posterior Cervical Nodes] : no posterior cervical lymphadenopathy [Normal Anterior Cervical Nodes] : no anterior cervical lymphadenopathy [No Joint Swelling] : no joint swelling [Grossly Normal Strength/Tone] : grossly normal strength/tone [No Rash] : no rash [Coordination Grossly Intact] : coordination grossly intact [No Focal Deficits] : no focal deficits [Normal Affect] : the affect was normal [Normal Insight/Judgement] : insight and judgment were intact

## 2024-05-28 ENCOUNTER — APPOINTMENT (OUTPATIENT)
Dept: GASTROENTEROLOGY | Facility: CLINIC | Age: 59
End: 2024-05-28
Payer: COMMERCIAL

## 2024-05-28 VITALS
OXYGEN SATURATION: 98 % | WEIGHT: 163 LBS | BODY MASS INDEX: 30 KG/M2 | TEMPERATURE: 97.1 F | SYSTOLIC BLOOD PRESSURE: 110 MMHG | HEART RATE: 90 BPM | DIASTOLIC BLOOD PRESSURE: 70 MMHG | HEIGHT: 62 IN

## 2024-05-28 DIAGNOSIS — Z00.00 ENCOUNTER FOR GENERAL ADULT MEDICAL EXAMINATION W/OUT ABNORMAL FINDINGS: ICD-10-CM

## 2024-05-28 PROCEDURE — 99213 OFFICE O/P EST LOW 20 MIN: CPT

## 2024-05-28 RX ORDER — POLYETHYLENE GLYCOL-3350 AND ELECTROLYTES 236; 6.74; 5.86; 2.97; 22.74 G/274.31G; G/274.31G; G/274.31G; G/274.31G; G/274.31G
236 POWDER, FOR SOLUTION ORAL
Qty: 1 | Refills: 0 | Status: ACTIVE | COMMUNITY
Start: 2024-05-28 | End: 1900-01-01

## 2024-05-28 NOTE — PHYSICAL EXAM
[Alert] : alert [Normal Voice/Communication] : normal voice/communication [Healthy Appearing] : healthy appearing [No Acute Distress] : no acute distress [Sclera] : the sclera and conjunctiva were normal [Hearing Threshold Finger Rub Not [place]] : hearing was normal [Normal Lips/Gums] : the lips and gums were normal [Oropharynx] : the oropharynx was normal [Normal Appearance] : the appearance of the neck was normal [No Neck Mass] : no neck mass was observed [No Respiratory Distress] : no respiratory distress [No Acc Muscle Use] : no accessory muscle use [Auscultation Breath Sounds / Voice Sounds] : lungs were clear to auscultation bilaterally [Respiration, Rhythm And Depth] : normal respiratory rhythm and effort [Heart Rate And Rhythm] : heart rate was normal and rhythm regular [Normal S1, S2] : normal S1 and S2 [Murmurs] : no murmurs [Bowel Sounds] : normal bowel sounds [Abdomen Tenderness] : non-tender [No Masses] : no abdominal mass palpated [Abdomen Soft] : soft [] : no hepatosplenomegaly [Oriented To Time, Place, And Person] : oriented to person, place, and time

## 2024-05-28 NOTE — ASSESSMENT
[FreeTextEntry1] : Impression and plan Patient came to the office today to discuss possible colonoscopy.  She has had a rough 2 years with treatment for breast cancer and recurrent C. difficile.  She is currently feeling much better with both issues.  I will schedule colonoscopy perhaps in a few months after she has time to taper or discontinue vancomycin.  I will ask her to contact me should there be any new issues in the meantime.  Further suggestions will follow.

## 2024-05-28 NOTE — REVIEW OF SYSTEMS
Chief Complaint   Patient presents with   • Irritable Bowel Syndrome   • Diarrhea       HPI    Nicki Fowler is a  48 y.o. female here for a follow up visit for IBS-D with a personal history of colon polyps.    Patient follows Dr. Mares, new to me.    She has been maintained on Pamelor 10 mg nightly with a good affect needs a refill.  Denies diarrhea, constipation, abdominal pain, rectal pain, rectal bleeding on visit today.   Triggers include gluten and dairy which she avoids.    She is due for screening colonoscopy in .    No upper GI complaints.      Past Medical History:   Diagnosis Date   • Anxiety and depression    • Arthritis    • Cervical disc disorder    • Closed fracture of coccyx (HCC) 2013   • Colon polyp 2018    Dr. Mares removed during colonoscopy   • Dizziness     constant x 3 weeks   • Headache    • History of seasonal allergies    • IBS (irritable bowel syndrome)    • Lactose intolerance early     dairy causes bloating and gas   • Low back pain    • Lumbosacral disc disease    • Meniere's disease    • Peripheral neuropathy        Past Surgical History:   Procedure Laterality Date   • ABDOMINAL SURGERY  10/10/1996       • ANTERIOR CERVICAL DISCECTOMY W/ FUSION N/A 2018    Procedure: C5-6 ANTERIOR CERVICAL DISCECTOMY FUSION WITH INSTRUMENTATION;  Surgeon: Porter Kendrick MD;  Location: [facility];  Service: Neurosurgery   •  SECTION     • COLONOSCOPY N/A 2018    One 6 mm polyp in the ascending colon, NBIH.  PATH: Hyperplastic polyp    • EPIDURAL BLOCK      Series of 4 block for cervical ddd   • LASIK     • WISDOM TOOTH EXTRACTION         Scheduled Meds:     Continuous Infusions: No current facility-administered medications for this visit.      PRN Meds:     Allergies   Allergen Reactions   • Cinnamon Other (See Comments) and Shortness Of Breath   • Shellfish-Derived Products Hives       Social History     Socioeconomic History   • Marital status:     • Number of children: 3   • Years of education: BA   Tobacco Use   • Smoking status: Former Smoker     Packs/day: 2.00     Years: 20.00     Pack years: 40.00     Types: Cigarettes     Start date:      Quit date:      Years since quittin.7   • Smokeless tobacco: Never Used   • Tobacco comment: stopped 5 years for pregnancies   Substance and Sexual Activity   • Alcohol use: No     Comment: Social   • Drug use: No   • Sexual activity: Yes     Partners: Male     Birth control/protection: Other       Family History   Problem Relation Age of Onset   • Cancer Mother         Non-Hodgkins Lymphoma   • Hypertension Mother    • Atrial fibrillation Mother    • Arthritis Mother         DDD   • Heart disease Mother         A-fib   • Hyperlipidemia Mother    • Thyroid disease Mother         Hypothyroid   • Cancer Father         Hodgkins Lymphoma and Non-Hodgkins Lymphoma at two separate times of his life   • Heart attack Father    • Stroke Father    • Hypertension Brother    • Arthritis Brother         DDD   • Hyperlipidemia Brother    • Asthma Daughter    • Arthritis Brother         DDD   • Heart disease Maternal Aunt         Congestive Heart Failure   • Malig Hyperthermia Neg Hx        Review of Systems   Constitutional: Negative for activity change, appetite change, fatigue, fever and unexpected weight change.   HENT: Negative for trouble swallowing.    Respiratory: Negative for apnea, cough, choking, chest tightness, shortness of breath and wheezing.    Cardiovascular: Negative for chest pain, palpitations and leg swelling.   Gastrointestinal: Negative for abdominal distention, abdominal pain, anal bleeding, blood in stool, constipation, diarrhea, nausea, rectal pain and vomiting.       Vitals:    10/13/21 1105   Temp: 97.5 °F (36.4 °C)       Physical Exam  Constitutional:       Appearance: She is well-developed.   Abdominal:      General: Bowel sounds are normal. There is no distension.      Palpations:  Abdomen is soft. There is no mass.      Tenderness: There is no abdominal tenderness. There is no guarding.      Hernia: No hernia is present.   Skin:     General: Skin is warm and dry.      Capillary Refill: Capillary refill takes less than 2 seconds.   Neurological:      Mental Status: She is alert and oriented to person, place, and time.   Psychiatric:         Behavior: Behavior normal.     Assessment    Diagnoses and all orders for this visit:    1. Irritable bowel syndrome with diarrhea (Primary)    2. Personal history of colonic polyps       Plan    Stable IBS-D.  Continue Pamelor.  Refill provided.  Colonoscopy for screening purposes 2023.  Return to clinic 1 year or sooner if needed.           JAYDA Marinelli  [facility]  [address] [place] [address]  Office: [phone]    .diag   [Negative] : Heme/Lymph

## 2024-05-28 NOTE — HISTORY OF PRESENT ILLNESS
[FreeTextEntry1] : Patient came to the office today for follow-up and discussion.  Patient has been successfully treated for breast cancer and is now off chemotherapy but continues to take anastrozole on a daily basis.  Patient continues to take vancomycin for history of recurrent C. difficile.  She has been instructed to taper and discontinue if possible.  Patient has been following with ID for this reason.  Patient denies current complaints of nausea vomiting fever chills rectal bleeding or melena.  She is desirous of screening colonoscopy.

## 2024-07-03 ENCOUNTER — APPOINTMENT (OUTPATIENT)
Dept: BREAST CENTER | Facility: CLINIC | Age: 59
End: 2024-07-03
Payer: COMMERCIAL

## 2024-07-03 VITALS
BODY MASS INDEX: 30 KG/M2 | SYSTOLIC BLOOD PRESSURE: 118 MMHG | DIASTOLIC BLOOD PRESSURE: 80 MMHG | HEIGHT: 62 IN | HEART RATE: 88 BPM | WEIGHT: 163 LBS

## 2024-07-03 DIAGNOSIS — C77.3 MALIGNANT NEOPLASM OF UNSPECIFIED SITE OF RIGHT FEMALE BREAST: ICD-10-CM

## 2024-07-03 DIAGNOSIS — C50.911 MALIGNANT NEOPLASM OF UNSPECIFIED SITE OF RIGHT FEMALE BREAST: ICD-10-CM

## 2024-07-03 PROCEDURE — 99213 OFFICE O/P EST LOW 20 MIN: CPT

## 2024-07-11 ENCOUNTER — NON-APPOINTMENT (OUTPATIENT)
Age: 59
End: 2024-07-11

## 2024-07-28 PROBLEM — N61.0 MASTITIS: Status: ACTIVE | Noted: 2024-07-28

## 2024-07-29 ENCOUNTER — RESULT REVIEW (OUTPATIENT)
Age: 59
End: 2024-07-29

## 2024-07-29 ENCOUNTER — APPOINTMENT (OUTPATIENT)
Dept: BREAST CENTER | Facility: CLINIC | Age: 59
End: 2024-07-29
Payer: COMMERCIAL

## 2024-07-29 VITALS
WEIGHT: 163 LBS | DIASTOLIC BLOOD PRESSURE: 84 MMHG | BODY MASS INDEX: 30 KG/M2 | HEART RATE: 86 BPM | HEIGHT: 62 IN | SYSTOLIC BLOOD PRESSURE: 134 MMHG

## 2024-07-29 DIAGNOSIS — N61.0 MASTITIS WITHOUT ABSCESS: ICD-10-CM

## 2024-07-29 PROCEDURE — 99212 OFFICE O/P EST SF 10 MIN: CPT

## 2024-07-29 NOTE — HISTORY OF PRESENT ILLNESS
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  She then had some erythema of her right lower extremity and although it didn't seem like another episode of phlebitis, she was advised to see an Oncologist to evaluate for an underlying malignancy.  She saw Dr. Bernardino Ortiz and had a CT of her chest , abdomen and pelvis in July 2022.  She was found to have a right axillary node.  A biopsy of the node showed metastatic adenocarcinoma, possible breast primary.  She had a mammogram and breast ultrasound and a suspicious mass was found in the right breast.  A core biopsy confirmed the primary in the breast. PET/CT did  not show distant metastatic disease. A Saviscout was placed in the right axillary node.  She received chemotherapy with TCHPx6 with Dr. Ortiz, but changed Oncologists and [place] [facility]  She had a very difficult time with severe diarrhea and the Perjeta was dropped with the last treatment.  She underwent a magseed localized right breast lumpectomy with Oncoplastic approach and right sentinel node biopsy/excision of the originally positive node on 2/13/2022.  Pathology showed residual infiltrating ductal cancer in the breast dispersed over 1.7 cm but with abundant fibrosis and treatment effect.  Margins were negative.  The originally positive node had micrometastatic disease 1.2 mm, SLN #2 had isolated tumor cells and SLN#3 was negative.  [place] [facility] [place]  She also received Kadcyla.  She had episodic pain in the right breast that  has resolved.   She retired in April.   She is here today with a c/o right breast redness and fullness since yesterday.  The area is warm to the touch.  She denies fevers, chills or general malaise.   Patient provides photo of right breast from 24 hours ago to illustrate area of erythema.  She called service yesterday and spoke to Dr. Salguero who sent an RX for Cefadroxil .  Patient states decrease in erythema and discomfort after two dose of antibiotic. Detail Level: Detailed Quality 130: Documentation Of Current Medications In The Medical Record: Current Medications Documented Quality 111:Pneumonia Vaccination Status For Older Adults: Pneumococcal Vaccination not Administered or Previously Received, Reason not Otherwise Specified Quality 226: Preventive Care And Screening: Tobacco Use: Screening And Cessation Intervention: Patient screened for tobacco use and is an ex/non-smoker

## 2024-07-29 NOTE — PHYSICAL EXAM
[No dominant masses] : no dominant masses in right breast  [No Nipple Retraction] : no right nipple retraction [No Nipple Discharge] : no right nipple discharge [No Axillary Lymphadenopathy] : no right axillary lymphadenopathy [de-identified] : No masses or obvious fluid collections appreciated.  Reduction pattern scars. Right breast > left  (patient notes this is not new) .  Erythema is noted from 12:00 - 6:00 (decreasing as compared to photo provided from patient from 24 hours ago).  Slightly warm to touch

## 2024-07-30 ENCOUNTER — OUTPATIENT (OUTPATIENT)
Dept: OUTPATIENT SERVICES | Facility: HOSPITAL | Age: 59
LOS: 1 days | End: 2024-07-30
Payer: COMMERCIAL

## 2024-07-30 ENCOUNTER — APPOINTMENT (OUTPATIENT)
Dept: ULTRASOUND IMAGING | Facility: CLINIC | Age: 59
End: 2024-07-30
Payer: COMMERCIAL

## 2024-07-30 ENCOUNTER — RESULT REVIEW (OUTPATIENT)
Age: 59
End: 2024-07-30

## 2024-07-30 DIAGNOSIS — Z98.890 OTHER SPECIFIED POSTPROCEDURAL STATES: Chronic | ICD-10-CM

## 2024-07-30 DIAGNOSIS — Z90.89 ACQUIRED ABSENCE OF OTHER ORGANS: Chronic | ICD-10-CM

## 2024-07-30 DIAGNOSIS — N61.0 MASTITIS WITHOUT ABSCESS: ICD-10-CM

## 2024-07-30 DIAGNOSIS — Z00.8 ENCOUNTER FOR OTHER GENERAL EXAMINATION: ICD-10-CM

## 2024-07-30 PROCEDURE — 76641 ULTRASOUND BREAST COMPLETE: CPT | Mod: 26,RT

## 2024-07-30 PROCEDURE — 76641 ULTRASOUND BREAST COMPLETE: CPT

## 2024-08-01 ENCOUNTER — NON-APPOINTMENT (OUTPATIENT)
Age: 59
End: 2024-08-01

## 2024-08-07 ENCOUNTER — APPOINTMENT (OUTPATIENT)
Dept: BREAST CENTER | Facility: CLINIC | Age: 59
End: 2024-08-07

## 2024-08-07 PROCEDURE — 99213 OFFICE O/P EST LOW 20 MIN: CPT

## 2024-08-07 NOTE — HISTORY OF PRESENT ILLNESS
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  She then had some erythema of her right lower extremity and although it didn't seem like another episode of phlebitis, she was advised to see an Oncologist to evaluate for an underlying malignancy.  She saw Dr. Bernardino Ortiz and had a CT of her chest , abdomen and pelvis in July 2022.  She was found to have a right axillary node.  A biopsy of the node showed metastatic adenocarcinoma, possible breast primary.  She had a mammogram and breast ultrasound and a suspicious mass was found in the right breast.  A core biopsy confirmed the primary in the breast. PET/CT did  not show distant metastatic disease. A Saviscout was placed in the right axillary node.  She received chemotherapy with TCHPx6 with Dr. Ortiz, but changed Oncologists and to Dr. Milan at [facility].  She had a very difficult time with severe diarrhea and the Perjeta was dropped with the last treatment.  She underwent a magseed localized right breast lumpectomy with Oncoplastic approach and right sentinel node biopsy/excision of the originally positive node on 2/13/2022.  Pathology showed residual infiltrating ductal cancer in the breast dispersed over 1.7 cm but with abundant fibrosis and treatment effect.  Margins were negative.  The originally positive node had micrometastatic disease 1.2 mm, SLN #2 had isolated tumor cells and SLN#3 was negative.  She completed radiation therapy at [facility] in [place].  She also received Kadcyla. She saw a new Oncologist at [facility] and they spoke to her about taking a bisphosphonate.  She's not sure she wants to do that and would like another opinion.  She had an episode of left breast cellulitis in October that resolved with antibiotics.   She called with redness of her right breast on July 28 and was started on antibiotics. The lower inner breast was tender and red. She did not have a fever.  It has now resolved.

## 2024-08-07 NOTE — PHYSICAL EXAM
[EOMI] : extra ocular movement intact [Sclera nonicteric] : sclera nonicteric [Supple] : supple [No Supraclavicular Adenopathy] : no supraclavicular adenopathy [No Cervical Adenopathy] : no cervical adenopathy [No Thyromegaly] : no thyromegaly [Clear to Auscultation Bilat] : clear to auscultation bilaterally [Examined in the supine and seated position] : examined in the supine and seated position [No dominant masses] : no dominant masses in right breast  [No dominant masses] : no dominant masses left breast [No Nipple Retraction] : no left nipple retraction [No Nipple Discharge] : no left nipple discharge [No Axillary Lymphadenopathy] : no left axillary lymphadenopathy [Soft] : abdomen soft [Not Tender] : non-tender [de-identified] : Reduction pattern scars.   Minimal radiation hyperpigmentation and edema. No erythema. [de-identified] : Reduction pattern scars.     [de-identified] : Axillary scar.

## 2024-08-12 ENCOUNTER — OUTPATIENT (OUTPATIENT)
Dept: OUTPATIENT SERVICES | Facility: HOSPITAL | Age: 59
LOS: 1 days | Discharge: HOME | End: 2024-08-12
Payer: COMMERCIAL

## 2024-08-12 DIAGNOSIS — Z98.890 OTHER SPECIFIED POSTPROCEDURAL STATES: Chronic | ICD-10-CM

## 2024-08-12 DIAGNOSIS — C50.919 MALIGNANT NEOPLASM OF UNSPECIFIED SITE OF UNSPECIFIED FEMALE BREAST: ICD-10-CM

## 2024-08-12 DIAGNOSIS — Z90.89 ACQUIRED ABSENCE OF OTHER ORGANS: Chronic | ICD-10-CM

## 2024-08-14 RX ORDER — SODIUM SULFATE, POTASSIUM SULFATE AND MAGNESIUM SULFATE 1.6; 3.13; 17.5 G/177ML; G/177ML; G/177ML
17.5-3.13-1.6 SOLUTION ORAL
Qty: 1 | Refills: 0 | Status: ACTIVE | COMMUNITY
Start: 2024-08-14 | End: 1900-01-01

## 2024-08-16 ENCOUNTER — APPOINTMENT (OUTPATIENT)
Dept: HEMATOLOGY ONCOLOGY | Facility: CLINIC | Age: 59
End: 2024-08-16
Payer: COMMERCIAL

## 2024-08-16 VITALS
DIASTOLIC BLOOD PRESSURE: 70 MMHG | RESPIRATION RATE: 17 BRPM | OXYGEN SATURATION: 98 % | HEART RATE: 82 BPM | WEIGHT: 169.75 LBS | HEIGHT: 62.09 IN | BODY MASS INDEX: 30.84 KG/M2 | TEMPERATURE: 97.9 F | SYSTOLIC BLOOD PRESSURE: 103 MMHG

## 2024-08-16 DIAGNOSIS — C50.911 MALIGNANT NEOPLASM OF UNSPECIFIED SITE OF RIGHT FEMALE BREAST: ICD-10-CM

## 2024-08-16 DIAGNOSIS — C77.3 MALIGNANT NEOPLASM OF UNSPECIFIED SITE OF RIGHT FEMALE BREAST: ICD-10-CM

## 2024-08-16 PROCEDURE — G2211 COMPLEX E/M VISIT ADD ON: CPT

## 2024-08-16 PROCEDURE — 99205 OFFICE O/P NEW HI 60 MIN: CPT

## 2024-08-16 NOTE — HISTORY OF PRESENT ILLNESS
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 diff colitis presents for initial consultation w/ med oncology for second opinion.  Chronologic cancer history:  12/2021 normal mammogram 6/2022 sonogram normal, then 2 weeks later developed rash/phlebitis on LE for which vascular surgery c/f malignancy. She was then seen by oncology who ordered CT..CT C/A/P showed enlarged 3 cm R axillary LN (7/13/22)  8/2022 R axillary LN biopsy dx w/ ER+FL+[id number]+ carcinoma, follow up mammogram showed 3cm above the areola 10'o clock position 8/25/2022: TCHP c/b bloody diarrhea 2/2 colitis. Given her chemo side effects were not managed well, she switched to [facility] Dr. Milan, completed 6 cycles Due to severe diarrhea Perjeta was dropped with the last treatment. 2/13/2023: underwent R lumpectomy, BL mammoplasty/breast reduction. Pathology consistent w/ residual disease.  Pathology showed residual infiltrating ductal cancer in the breast dispersed over 1.7 cm but with abundant fibrosis and treatment effect. Margins were negative. The originally positive node had micrometastatic disease 1.2 mm, SLN #2 had isolated tumor cells and SLN#3 was negative. 3/2024 started Anastrazole 4/2023-5/2023: completed XRT 5/2023: started Kadcyla. Kadcyla planned for 14 cyckes, c/b muliple times of neutropenia, Neulasta didn't help. Kadcyla dose reduced twice but still remained neutropenic and tx was postponed multiple times  8/30/2023: Bilateral mammogram : No mammographic or sonographic evidence of malignancy. New bilateral mammoplasty  12/20/2023:: Bilateral breast MRI  No evidence of malignancy. 2/1/2024 completed 14 cycles of Kadcyla 3/2024: ECHO normal 4/3/24: Normal DEXA    T score-0.2 6/2024: Dr. Valiente left [facility] and transferred her care to another new physician at [facility] and was recommended to start Zoledronic acid infusion.  7/30/2024: Right breast ultrasound Negative. .  PMHx: asthma, sinusitis, C diff Social Hx: retired pediatrician, never smoker, occasional alcohol, never recreational drugs Fam Hx: breast cancer  [de-identified] : 8/16/24: Doing well, recently retired as a pediatrician. Does not have any arthralgias, hot flashes. Tolerating AI well. Denies any breast changes except ocassional mild pain on the R breast when she sleeps on R side.

## 2024-08-16 NOTE — PHYSICAL EXAM
[Fully active, able to carry on all pre-disease performance without restriction] : Status 0 - Fully active, able to carry on all pre-disease performance without restriction [Normal] : affect appropriate [de-identified] : BL breast reduction mammoplasty scars, healed right axillary scar

## 2024-08-16 NOTE — END OF VISIT
[] : Fellow [FreeTextEntry3] : I was present with the fellow for the entire visit including history taking, exam, assessment and plan. [Time Spent: ___ minutes] : I have spent [unfilled] minutes of time on the encounter.

## 2024-08-16 NOTE — ASSESSMENT
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 diff colitis presents for initial consultation w/ med oncology for second opinion.  - Triple positive breast ca: She has completed chemo and HER 2targeted therapy. She is on anastrozole and tolerating it well. She was recommended zometa by her new onc. We discussed data for adjuvant zometa. She has normal DEXA and there is concern for toxicity. She is specifically seeking second opinion to discuss risks/benefits of zometa. She is not inclined to take zometa due to toxicity concerns and controversial benefit. She tere continue anastrozole and continue care at [facility]  The patient had plenty of time to ask questions and all questions were answered to satisfaction. I gave my office phone number and encouraged to call with any questions or additional information.

## 2024-08-21 ENCOUNTER — RESULT REVIEW (OUTPATIENT)
Age: 59
End: 2024-08-21

## 2024-08-21 ENCOUNTER — APPOINTMENT (OUTPATIENT)
Dept: HEMATOLOGY ONCOLOGY | Facility: CLINIC | Age: 59
End: 2024-08-21

## 2024-08-21 LAB
BASOPHILS # BLD AUTO: 0.03 K/UL — SIGNIFICANT CHANGE UP (ref 0–0.2)
BASOPHILS NFR BLD AUTO: 0.7 % — SIGNIFICANT CHANGE UP (ref 0–2)
EOSINOPHIL # BLD AUTO: 0.12 K/UL — SIGNIFICANT CHANGE UP (ref 0–0.5)
EOSINOPHIL NFR BLD AUTO: 2.9 % — SIGNIFICANT CHANGE UP (ref 0–6)
HCT VFR BLD CALC: 39.3 % — SIGNIFICANT CHANGE UP (ref 34.5–45)
HGB BLD-MCNC: 12.8 G/DL — SIGNIFICANT CHANGE UP (ref 11.5–15.5)
IMM GRANULOCYTES NFR BLD AUTO: 0.5 % — SIGNIFICANT CHANGE UP (ref 0–0.9)
LYMPHOCYTES # BLD AUTO: 2.14 K/UL — SIGNIFICANT CHANGE UP (ref 1–3.3)
LYMPHOCYTES # BLD AUTO: 51.8 % — HIGH (ref 13–44)
MCHC RBC-ENTMCNC: 26.1 PG — LOW (ref 27–34)
MCHC RBC-ENTMCNC: 32.6 G/DL — SIGNIFICANT CHANGE UP (ref 32–36)
MCV RBC AUTO: 80 FL — SIGNIFICANT CHANGE UP (ref 80–100)
MONOCYTES # BLD AUTO: 0.32 K/UL — SIGNIFICANT CHANGE UP (ref 0–0.9)
MONOCYTES NFR BLD AUTO: 7.7 % — SIGNIFICANT CHANGE UP (ref 2–14)
NEUTROPHILS # BLD AUTO: 1.5 K/UL — LOW (ref 1.8–7.4)
NEUTROPHILS NFR BLD AUTO: 36.4 % — LOW (ref 43–77)
NRBC # BLD: 0 /100 WBCS — SIGNIFICANT CHANGE UP (ref 0–0)
NRBC BLD-RTO: 0 /100 WBCS — SIGNIFICANT CHANGE UP (ref 0–0)
PLATELET # BLD AUTO: 188 K/UL — SIGNIFICANT CHANGE UP (ref 150–400)
RBC # BLD: 4.91 M/UL — SIGNIFICANT CHANGE UP (ref 3.8–5.2)
RBC # FLD: 13.7 % — SIGNIFICANT CHANGE UP (ref 10.3–14.5)
SURGICAL PATHOLOGY STUDY: SIGNIFICANT CHANGE UP
WBC # BLD: 4.13 K/UL — SIGNIFICANT CHANGE UP (ref 3.8–10.5)
WBC # FLD AUTO: 4.13 K/UL — SIGNIFICANT CHANGE UP (ref 3.8–10.5)

## 2024-08-21 PROCEDURE — 88321 CONSLTJ&REPRT SLD PREP ELSWR: CPT

## 2024-08-22 ENCOUNTER — APPOINTMENT (OUTPATIENT)
Dept: GASTROENTEROLOGY | Facility: AMBULATORY MEDICAL SERVICES | Age: 59
End: 2024-08-22
Payer: COMMERCIAL

## 2024-08-22 LAB — CANCER AG27-29 SERPL-ACNC: 21.6 U/ML

## 2024-08-22 PROCEDURE — 45378 DIAGNOSTIC COLONOSCOPY: CPT

## 2024-08-23 LAB
ALBUMIN SERPL ELPH-MCNC: 4.9 G/DL
ALP BLD-CCNC: 158 U/L
ALT SERPL-CCNC: 40 U/L
ANION GAP SERPL CALC-SCNC: 20 MMOL/L
AST SERPL-CCNC: 37 U/L
BILIRUB SERPL-MCNC: 0.9 MG/DL
BUN SERPL-MCNC: 11 MG/DL
CALCIUM SERPL-MCNC: 10.1 MG/DL
CHLORIDE SERPL-SCNC: 102 MMOL/L
CO2 SERPL-SCNC: 19 MMOL/L
CREAT SERPL-MCNC: 0.71 MG/DL
EGFR: 97 ML/MIN/1.73M2
GLUCOSE SERPL-MCNC: 102 MG/DL
POTASSIUM SERPL-SCNC: 4.7 MMOL/L
PROT SERPL-MCNC: 7.4 G/DL
SODIUM SERPL-SCNC: 141 MMOL/L

## 2024-08-24 LAB — CEA SERPL-MCNC: 1.7 NG/ML

## 2024-08-30 ENCOUNTER — TRANSCRIPTION ENCOUNTER (OUTPATIENT)
Age: 59
End: 2024-08-30

## 2024-09-03 ENCOUNTER — APPOINTMENT (OUTPATIENT)
Dept: MAMMOGRAPHY | Facility: CLINIC | Age: 59
End: 2024-09-03
Payer: COMMERCIAL

## 2024-09-03 ENCOUNTER — RESULT REVIEW (OUTPATIENT)
Age: 59
End: 2024-09-03

## 2024-09-03 ENCOUNTER — APPOINTMENT (OUTPATIENT)
Dept: ULTRASOUND IMAGING | Facility: CLINIC | Age: 59
End: 2024-09-03
Payer: COMMERCIAL

## 2024-09-03 ENCOUNTER — OUTPATIENT (OUTPATIENT)
Dept: OUTPATIENT SERVICES | Facility: HOSPITAL | Age: 59
LOS: 1 days | End: 2024-09-03
Payer: COMMERCIAL

## 2024-09-03 DIAGNOSIS — Z98.890 OTHER SPECIFIED POSTPROCEDURAL STATES: Chronic | ICD-10-CM

## 2024-09-03 DIAGNOSIS — Z90.89 ACQUIRED ABSENCE OF OTHER ORGANS: Chronic | ICD-10-CM

## 2024-09-03 DIAGNOSIS — C50.911 MALIGNANT NEOPLASM OF UNSPECIFIED SITE OF RIGHT FEMALE BREAST: ICD-10-CM

## 2024-09-03 PROCEDURE — 77066 DX MAMMO INCL CAD BI: CPT

## 2024-09-03 PROCEDURE — 76641 ULTRASOUND BREAST COMPLETE: CPT

## 2024-09-03 PROCEDURE — G0279: CPT | Mod: 26

## 2024-09-03 PROCEDURE — G0279: CPT

## 2024-09-03 PROCEDURE — 76641 ULTRASOUND BREAST COMPLETE: CPT | Mod: 26,50

## 2024-09-03 PROCEDURE — 77066 DX MAMMO INCL CAD BI: CPT | Mod: 26

## 2024-09-05 ENCOUNTER — NON-APPOINTMENT (OUTPATIENT)
Age: 59
End: 2024-09-05

## 2024-09-17 ENCOUNTER — LABORATORY RESULT (OUTPATIENT)
Age: 59
End: 2024-09-17

## 2024-09-23 ENCOUNTER — TRANSCRIPTION ENCOUNTER (OUTPATIENT)
Age: 59
End: 2024-09-23

## 2024-09-23 ENCOUNTER — NON-APPOINTMENT (OUTPATIENT)
Age: 59
End: 2024-09-23

## 2024-09-24 ENCOUNTER — TRANSCRIPTION ENCOUNTER (OUTPATIENT)
Age: 59
End: 2024-09-24

## 2024-09-26 ENCOUNTER — APPOINTMENT (OUTPATIENT)
Dept: INFECTIOUS DISEASE | Facility: CLINIC | Age: 59
End: 2024-09-26

## 2024-09-26 DIAGNOSIS — A04.71 ENTEROCOLITIS DUE TO CLOSTRIDIUM DIFFICILE, RECURRENT: ICD-10-CM

## 2024-09-26 RX ORDER — VANCOMYCIN HYDROCHLORIDE 125 MG/1
125 CAPSULE ORAL
Qty: 60 | Refills: 2 | Status: ACTIVE | COMMUNITY
Start: 2024-09-26 | End: 1900-01-01

## 2024-12-18 DIAGNOSIS — R73.03 PREDIABETES.: ICD-10-CM

## 2024-12-22 LAB
CDIFF BY PCR: NOT DETECTED
ENTEROPATHOGENIC E. COLI (EPEC): DETECTED
GI PCR PANEL: DETECTED

## 2024-12-23 LAB
ESTIMATED AVERAGE GLUCOSE: 131 MG/DL
HBA1C MFR BLD HPLC: 6.2 %

## 2024-12-24 ENCOUNTER — APPOINTMENT (OUTPATIENT)
Dept: OPHTHALMOLOGY | Facility: CLINIC | Age: 59
End: 2024-12-24

## 2024-12-30 ENCOUNTER — RX RENEWAL (OUTPATIENT)
Age: 59
End: 2024-12-30

## 2025-01-01 ENCOUNTER — RX RENEWAL (OUTPATIENT)
Age: 60
End: 2025-01-01

## 2025-01-08 ENCOUNTER — APPOINTMENT (OUTPATIENT)
Dept: BREAST CENTER | Facility: CLINIC | Age: 60
End: 2025-01-08
Payer: COMMERCIAL

## 2025-01-08 ENCOUNTER — NON-APPOINTMENT (OUTPATIENT)
Age: 60
End: 2025-01-08

## 2025-01-08 VITALS
DIASTOLIC BLOOD PRESSURE: 82 MMHG | SYSTOLIC BLOOD PRESSURE: 120 MMHG | HEIGHT: 62 IN | WEIGHT: 177 LBS | BODY MASS INDEX: 32.57 KG/M2 | HEART RATE: 92 BPM

## 2025-01-08 DIAGNOSIS — C77.3 MALIGNANT NEOPLASM OF UNSPECIFIED SITE OF RIGHT FEMALE BREAST: ICD-10-CM

## 2025-01-08 DIAGNOSIS — Z09 ENCOUNTER FOR FOLLOW-UP EXAMINATION AFTER COMPLETED TREATMENT FOR CONDITIONS OTHER THAN MALIGNANT NEOPLASM: ICD-10-CM

## 2025-01-08 DIAGNOSIS — C50.911 MALIGNANT NEOPLASM OF UNSPECIFIED SITE OF RIGHT FEMALE BREAST: ICD-10-CM

## 2025-01-08 PROCEDURE — 99213 OFFICE O/P EST LOW 20 MIN: CPT

## 2025-01-08 RX ORDER — ASPIRIN 81 MG
81 TABLET,CHEWABLE ORAL
Refills: 0 | Status: ACTIVE | COMMUNITY

## 2025-01-08 RX ORDER — CHOLECALCIFEROL (VITAMIN D3) 25 MCG
TABLET ORAL
Refills: 0 | Status: ACTIVE | COMMUNITY

## 2025-01-27 ENCOUNTER — RX RENEWAL (OUTPATIENT)
Age: 60
End: 2025-01-27

## 2025-02-12 ENCOUNTER — RX RENEWAL (OUTPATIENT)
Age: 60
End: 2025-02-12

## 2025-03-12 ENCOUNTER — APPOINTMENT (OUTPATIENT)
Dept: GASTROENTEROLOGY | Facility: CLINIC | Age: 60
End: 2025-03-12
Payer: COMMERCIAL

## 2025-03-12 VITALS
BODY MASS INDEX: 32.39 KG/M2 | SYSTOLIC BLOOD PRESSURE: 118 MMHG | OXYGEN SATURATION: 98 % | WEIGHT: 176 LBS | HEART RATE: 123 BPM | TEMPERATURE: 97.5 F | HEIGHT: 62 IN | DIASTOLIC BLOOD PRESSURE: 80 MMHG

## 2025-03-12 DIAGNOSIS — R19.7 DIARRHEA, UNSPECIFIED: ICD-10-CM

## 2025-03-12 PROCEDURE — 99214 OFFICE O/P EST MOD 30 MIN: CPT

## 2025-03-12 RX ORDER — FIDAXOMICIN 200 MG/1
200 TABLET, FILM COATED ORAL TWICE DAILY
Qty: 20 | Refills: 0 | Status: ACTIVE | COMMUNITY
Start: 2025-03-12 | End: 1900-01-01

## 2025-03-13 LAB — CDIFF BY PCR: DETECTED

## 2025-03-22 LAB — BACTERIA STL CULT: NORMAL

## 2025-03-25 ENCOUNTER — APPOINTMENT (OUTPATIENT)
Dept: MRI IMAGING | Facility: CLINIC | Age: 60
End: 2025-03-25
Payer: COMMERCIAL

## 2025-03-25 ENCOUNTER — OUTPATIENT (OUTPATIENT)
Dept: OUTPATIENT SERVICES | Facility: HOSPITAL | Age: 60
LOS: 1 days | End: 2025-03-25
Payer: COMMERCIAL

## 2025-03-25 ENCOUNTER — NON-APPOINTMENT (OUTPATIENT)
Age: 60
End: 2025-03-25

## 2025-03-25 DIAGNOSIS — Z98.890 OTHER SPECIFIED POSTPROCEDURAL STATES: Chronic | ICD-10-CM

## 2025-03-25 DIAGNOSIS — Z90.89 ACQUIRED ABSENCE OF OTHER ORGANS: Chronic | ICD-10-CM

## 2025-03-25 DIAGNOSIS — C50.911 MALIGNANT NEOPLASM OF UNSPECIFIED SITE OF RIGHT FEMALE BREAST: ICD-10-CM

## 2025-03-25 PROCEDURE — A9585: CPT

## 2025-03-25 PROCEDURE — 77049 MRI BREAST C-+ W/CAD BI: CPT | Mod: 26

## 2025-03-25 PROCEDURE — C8908: CPT

## 2025-03-25 PROCEDURE — C8937: CPT

## 2025-04-03 ENCOUNTER — NON-APPOINTMENT (OUTPATIENT)
Age: 60
End: 2025-04-03

## 2025-04-03 ENCOUNTER — APPOINTMENT (OUTPATIENT)
Dept: INFECTIOUS DISEASE | Facility: CLINIC | Age: 60
End: 2025-04-03
Payer: COMMERCIAL

## 2025-04-03 VITALS
DIASTOLIC BLOOD PRESSURE: 85 MMHG | SYSTOLIC BLOOD PRESSURE: 128 MMHG | HEART RATE: 104 BPM | HEIGHT: 62 IN | BODY MASS INDEX: 32.39 KG/M2 | TEMPERATURE: 98.5 F | OXYGEN SATURATION: 97 % | WEIGHT: 176 LBS

## 2025-04-03 DIAGNOSIS — A04.71 ENTEROCOLITIS DUE TO CLOSTRIDIUM DIFFICILE, RECURRENT: ICD-10-CM

## 2025-04-03 PROCEDURE — 99213 OFFICE O/P EST LOW 20 MIN: CPT

## 2025-04-03 RX ORDER — VANCOMYCIN HYDROCHLORIDE 250 MG/1
250 CAPSULE ORAL 4 TIMES DAILY
Qty: 56 | Refills: 1 | Status: ACTIVE | COMMUNITY
Start: 2025-04-03 | End: 1900-01-01

## 2025-04-03 RX ORDER — VANCOMYCIN HYDROCHLORIDE 125 MG/1
125 CAPSULE ORAL DAILY
Qty: 30 | Refills: 2 | Status: ACTIVE | COMMUNITY
Start: 2025-04-03 | End: 1900-01-01

## 2025-04-10 ENCOUNTER — NON-APPOINTMENT (OUTPATIENT)
Age: 60
End: 2025-04-10

## 2025-04-10 RX ORDER — FECAL MICROBIOTA SPORES, LIVE-BRPK 30000000 [CFU]/1
CAPSULE ORAL DAILY
Qty: 12 | Refills: 0 | Status: ACTIVE | COMMUNITY
Start: 2025-04-10 | End: 1900-01-01

## 2025-04-22 ENCOUNTER — NON-APPOINTMENT (OUTPATIENT)
Age: 60
End: 2025-04-22

## 2025-04-23 ENCOUNTER — NON-APPOINTMENT (OUTPATIENT)
Age: 60
End: 2025-04-23

## 2025-05-20 ENCOUNTER — NON-APPOINTMENT (OUTPATIENT)
Age: 60
End: 2025-05-20

## 2025-05-20 ENCOUNTER — LABORATORY RESULT (OUTPATIENT)
Age: 60
End: 2025-05-20

## 2025-05-20 ENCOUNTER — APPOINTMENT (OUTPATIENT)
Dept: INTERNAL MEDICINE | Facility: CLINIC | Age: 60
End: 2025-05-20
Payer: COMMERCIAL

## 2025-05-20 VITALS
BODY MASS INDEX: 32.76 KG/M2 | TEMPERATURE: 98.2 F | RESPIRATION RATE: 17 BRPM | SYSTOLIC BLOOD PRESSURE: 112 MMHG | HEART RATE: 80 BPM | WEIGHT: 178 LBS | OXYGEN SATURATION: 98 % | DIASTOLIC BLOOD PRESSURE: 76 MMHG | HEIGHT: 62 IN

## 2025-05-20 DIAGNOSIS — E04.1 NONTOXIC SINGLE THYROID NODULE: ICD-10-CM

## 2025-05-20 DIAGNOSIS — Z00.00 ENCOUNTER FOR GENERAL ADULT MEDICAL EXAMINATION W/OUT ABNORMAL FINDINGS: ICD-10-CM

## 2025-05-20 DIAGNOSIS — R73.03 PREDIABETES.: ICD-10-CM

## 2025-05-20 DIAGNOSIS — R73.09 OTHER ABNORMAL GLUCOSE: ICD-10-CM

## 2025-05-20 DIAGNOSIS — E78.5 HYPERLIPIDEMIA, UNSPECIFIED: ICD-10-CM

## 2025-05-20 DIAGNOSIS — I10 ESSENTIAL (PRIMARY) HYPERTENSION: ICD-10-CM

## 2025-05-20 PROCEDURE — 99396 PREV VISIT EST AGE 40-64: CPT

## 2025-05-20 PROCEDURE — 93000 ELECTROCARDIOGRAM COMPLETE: CPT

## 2025-05-21 DIAGNOSIS — R74.8 ABNORMAL LEVELS OF OTHER SERUM ENZYMES: ICD-10-CM

## 2025-05-23 ENCOUNTER — NON-APPOINTMENT (OUTPATIENT)
Age: 60
End: 2025-05-23

## 2025-05-27 LAB
25(OH)D3 SERPL-MCNC: 33 NG/ML
ALBUMIN SERPL ELPH-MCNC: 5 G/DL
ALP BLD-CCNC: 133 U/L
ALP BLD-CCNC: 136 IU/L
ALP BONE CFR SERPL: 40 %
ALP INTEST CFR SERPL: 0 %
ALP LIVER CFR SERPL: 60 %
ALT SERPL-CCNC: 23 U/L
ANION GAP SERPL CALC-SCNC: 15 MMOL/L
APPEARANCE: CLEAR
AST SERPL-CCNC: 20 U/L
BASOPHILS # BLD AUTO: 0.06 K/UL
BASOPHILS NFR BLD AUTO: 1.2 %
BILIRUB SERPL-MCNC: 0.9 MG/DL
BILIRUBIN URINE: NEGATIVE
BLOOD URINE: NEGATIVE
BUN SERPL-MCNC: 15 MG/DL
CALCIUM SERPL-MCNC: 10 MG/DL
CHLORIDE SERPL-SCNC: 101 MMOL/L
CHOLEST SERPL-MCNC: 152 MG/DL
CO2 SERPL-SCNC: 23 MMOL/L
COLOR: YELLOW
CREAT SERPL-MCNC: 0.75 MG/DL
EGFRCR SERPLBLD CKD-EPI 2021: 90 ML/MIN/1.73M2
EOSINOPHIL # BLD AUTO: 0.12 K/UL
EOSINOPHIL NFR BLD AUTO: 2.4 %
ESTIMATED AVERAGE GLUCOSE: 134 MG/DL
GLUCOSE QUALITATIVE U: NEGATIVE MG/DL
GLUCOSE SERPL-MCNC: 103 MG/DL
HBA1C MFR BLD HPLC: 6.3 %
HCT VFR BLD CALC: 40.3 %
HDLC SERPL-MCNC: 48 MG/DL
HGB BLD-MCNC: 13 G/DL
IMM GRANULOCYTES NFR BLD AUTO: 0.2 %
KETONES URINE: NEGATIVE MG/DL
LDLC SERPL-MCNC: 82 MG/DL
LEUKOCYTE ESTERASE URINE: NEGATIVE
LYMPHOCYTES # BLD AUTO: 2.37 K/UL
LYMPHOCYTES NFR BLD AUTO: 46.9 %
MAN DIFF?: NORMAL
MCHC RBC-ENTMCNC: 25.4 PG
MCHC RBC-ENTMCNC: 32.3 G/DL
MCV RBC AUTO: 78.7 FL
MONOCYTES # BLD AUTO: 0.35 K/UL
MONOCYTES NFR BLD AUTO: 6.9 %
NEUTROPHILS # BLD AUTO: 2.14 K/UL
NEUTROPHILS NFR BLD AUTO: 42.4 %
NITRITE URINE: NEGATIVE
NONHDLC SERPL-MCNC: 104 MG/DL
PH URINE: 6
PLATELET # BLD AUTO: 279 K/UL
POTASSIUM SERPL-SCNC: 4.4 MMOL/L
PROT SERPL-MCNC: 7.2 G/DL
PROTEIN URINE: NEGATIVE MG/DL
RBC # BLD: 5.12 M/UL
RBC # FLD: 14.6 %
SODIUM SERPL-SCNC: 139 MMOL/L
SPECIFIC GRAVITY URINE: 1.01
TRIGL SERPL-MCNC: 122 MG/DL
TSH SERPL-ACNC: 1.08 UIU/ML
UROBILINOGEN URINE: 0.2 MG/DL
WBC # FLD AUTO: 5.05 K/UL

## 2025-06-03 ENCOUNTER — TRANSCRIPTION ENCOUNTER (OUTPATIENT)
Age: 60
End: 2025-06-03

## 2025-06-26 ENCOUNTER — APPOINTMENT (OUTPATIENT)
Dept: INFECTIOUS DISEASE | Facility: CLINIC | Age: 60
End: 2025-06-26
Payer: COMMERCIAL

## 2025-06-26 VITALS
WEIGHT: 178 LBS | TEMPERATURE: 98 F | SYSTOLIC BLOOD PRESSURE: 120 MMHG | DIASTOLIC BLOOD PRESSURE: 84 MMHG | OXYGEN SATURATION: 98 % | HEART RATE: 81 BPM | BODY MASS INDEX: 32.76 KG/M2 | HEIGHT: 62 IN

## 2025-06-26 PROCEDURE — 99213 OFFICE O/P EST LOW 20 MIN: CPT

## 2025-07-01 LAB
C DIFF TOXIN B QL PCR REFLEX: NORMAL
GDH ANTIGEN: NOT DETECTED
TOXIN A AND B: NOT DETECTED

## 2025-07-29 ENCOUNTER — APPOINTMENT (OUTPATIENT)
Dept: BREAST CENTER | Facility: CLINIC | Age: 60
End: 2025-07-29
Payer: COMMERCIAL

## 2025-07-29 VITALS
BODY MASS INDEX: 32.76 KG/M2 | HEIGHT: 62 IN | HEART RATE: 99 BPM | SYSTOLIC BLOOD PRESSURE: 118 MMHG | DIASTOLIC BLOOD PRESSURE: 81 MMHG | WEIGHT: 178 LBS

## 2025-07-29 DIAGNOSIS — C50.911 MALIGNANT NEOPLASM OF UNSPECIFIED SITE OF RIGHT FEMALE BREAST: ICD-10-CM

## 2025-07-29 DIAGNOSIS — C77.3 MALIGNANT NEOPLASM OF UNSPECIFIED SITE OF RIGHT FEMALE BREAST: ICD-10-CM

## 2025-07-29 PROCEDURE — 99213 OFFICE O/P EST LOW 20 MIN: CPT

## 2025-07-29 RX ORDER — MONTELUKAST 10 MG/1
10 TABLET, FILM COATED ORAL
Refills: 0 | Status: ACTIVE | COMMUNITY

## 2025-09-08 ENCOUNTER — RESULT REVIEW (OUTPATIENT)
Age: 60
End: 2025-09-08

## 2025-09-08 ENCOUNTER — APPOINTMENT (OUTPATIENT)
Dept: ULTRASOUND IMAGING | Facility: CLINIC | Age: 60
End: 2025-09-08
Payer: COMMERCIAL

## 2025-09-08 ENCOUNTER — APPOINTMENT (OUTPATIENT)
Dept: MAMMOGRAPHY | Facility: CLINIC | Age: 60
End: 2025-09-08
Payer: COMMERCIAL

## 2025-09-08 PROCEDURE — 77066 DX MAMMO INCL CAD BI: CPT | Mod: 26

## 2025-09-08 PROCEDURE — G0279: CPT | Mod: 26

## 2025-09-08 PROCEDURE — 76641 ULTRASOUND BREAST COMPLETE: CPT | Mod: 26,50
